# Patient Record
Sex: MALE | Race: WHITE | Employment: UNEMPLOYED | URBAN - METROPOLITAN AREA
[De-identification: names, ages, dates, MRNs, and addresses within clinical notes are randomized per-mention and may not be internally consistent; named-entity substitution may affect disease eponyms.]

---

## 2022-02-05 ENCOUNTER — APPOINTMENT (OUTPATIENT)
Dept: GENERAL RADIOLOGY | Age: 82
DRG: 087 | End: 2022-02-05
Payer: MEDICARE

## 2022-02-05 ENCOUNTER — HOSPITAL ENCOUNTER (INPATIENT)
Age: 82
LOS: 2 days | Discharge: HOME | DRG: 087 | End: 2022-02-07
Attending: EMERGENCY MEDICINE | Admitting: SURGERY
Payer: MEDICARE

## 2022-02-05 DIAGNOSIS — I61.9 INTRAPARENCHYMAL HEMORRHAGE OF BRAIN (HCC): ICD-10-CM

## 2022-02-05 DIAGNOSIS — I62.9 INTRACRANIAL HEMORRHAGE (HCC): Primary | ICD-10-CM

## 2022-02-05 LAB
ALLEN TEST: ABNORMAL
ANION GAP SERPL CALCULATED.3IONS-SCNC: 12 MMOL/L (ref 9–17)
BLOOD BANK SPECIMEN: ABNORMAL
BUN BLDV-MCNC: 36 MG/DL (ref 8–23)
CARBOXYHEMOGLOBIN: 0.9 % (ref 0–5)
CHLORIDE BLD-SCNC: 103 MMOL/L (ref 98–107)
CO2: 24 MMOL/L (ref 20–31)
CREAT SERPL-MCNC: 1.47 MG/DL (ref 0.7–1.2)
ETHANOL PERCENT: <0.01 %
ETHANOL: <10 MG/DL
FIO2: ABNORMAL
GFR AFRICAN AMERICAN: 56 ML/MIN
GFR NON-AFRICAN AMERICAN: 46 ML/MIN
GFR SERPL CREATININE-BSD FRML MDRD: ABNORMAL ML/MIN/{1.73_M2}
GFR SERPL CREATININE-BSD FRML MDRD: ABNORMAL ML/MIN/{1.73_M2}
GLUCOSE BLD-MCNC: 113 MG/DL (ref 75–110)
GLUCOSE BLD-MCNC: 128 MG/DL (ref 70–99)
HCG QUALITATIVE: ABNORMAL
HCO3 VENOUS: 25.9 MMOL/L (ref 24–30)
HCT VFR BLD CALC: 39 % (ref 40.7–50.3)
HEMOGLOBIN: 12.5 G/DL (ref 13–17)
INR BLD: 1
MCH RBC QN AUTO: 31 PG (ref 25.2–33.5)
MCHC RBC AUTO-ENTMCNC: 32.1 G/DL (ref 28.4–34.8)
MCV RBC AUTO: 96.8 FL (ref 82.6–102.9)
METHEMOGLOBIN: ABNORMAL % (ref 0–1.5)
MODE: ABNORMAL
MYOGLOBIN: 205 NG/ML (ref 28–72)
NEGATIVE BASE EXCESS, VEN: ABNORMAL MMOL/L (ref 0–2)
NOTIFICATION TIME: ABNORMAL
NOTIFICATION: ABNORMAL
NRBC AUTOMATED: 0 PER 100 WBC
O2 DEVICE/FLOW/%: ABNORMAL
O2 SAT, VEN: 51.3 % (ref 60–85)
OXYHEMOGLOBIN: ABNORMAL % (ref 95–98)
PARTIAL THROMBOPLASTIN TIME: 23.7 SEC (ref 20.5–30.5)
PATIENT TEMP: 37
PCO2, VEN, TEMP ADJ: ABNORMAL MMHG (ref 39–55)
PCO2, VEN: 46 (ref 39–55)
PDW BLD-RTO: 13 % (ref 11.8–14.4)
PEEP/CPAP: ABNORMAL
PH VENOUS: 7.37 (ref 7.32–7.42)
PH, VEN, TEMP ADJ: ABNORMAL (ref 7.32–7.42)
PLATELET # BLD: 275 K/UL (ref 138–453)
PMV BLD AUTO: 8.4 FL (ref 8.1–13.5)
PO2, VEN, TEMP ADJ: ABNORMAL MMHG (ref 30–50)
PO2, VEN: 26.8 (ref 30–50)
POSITIVE BASE EXCESS, VEN: 0.8 MMOL/L (ref 0–2)
POTASSIUM SERPL-SCNC: 5 MMOL/L (ref 3.7–5.3)
PROTHROMBIN TIME: 10.7 SEC (ref 9.1–12.3)
PSV: ABNORMAL
PT. POSITION: ABNORMAL
RBC # BLD: 4.03 M/UL (ref 4.21–5.77)
RESPIRATORY RATE: ABNORMAL
SAMPLE SITE: ABNORMAL
SARS-COV-2, RAPID: NOT DETECTED
SET RATE: ABNORMAL
SODIUM BLD-SCNC: 139 MMOL/L (ref 135–144)
SPECIMEN DESCRIPTION: NORMAL
TEXT FOR RESPIRATORY: ABNORMAL
TOTAL HB: ABNORMAL G/DL (ref 12–16)
TOTAL RATE: ABNORMAL
TROPONIN INTERP: ABNORMAL
TROPONIN INTERP: ABNORMAL
TROPONIN T: ABNORMAL NG/ML
TROPONIN T: ABNORMAL NG/ML
TROPONIN, HIGH SENSITIVITY: 54 NG/L (ref 0–22)
TROPONIN, HIGH SENSITIVITY: 57 NG/L (ref 0–22)
TSH SERPL DL<=0.05 MIU/L-ACNC: 3.28 MIU/L (ref 0.3–5)
VT: ABNORMAL
WBC # BLD: 17.6 K/UL (ref 3.5–11.3)

## 2022-02-05 PROCEDURE — 82565 ASSAY OF CREATININE: CPT

## 2022-02-05 PROCEDURE — 86920 COMPATIBILITY TEST SPIN: CPT

## 2022-02-05 PROCEDURE — 84443 ASSAY THYROID STIM HORMONE: CPT

## 2022-02-05 PROCEDURE — 6370000000 HC RX 637 (ALT 250 FOR IP)

## 2022-02-05 PROCEDURE — 86870 RBC ANTIBODY IDENTIFICATION: CPT

## 2022-02-05 PROCEDURE — 6810039001 HC L1 TRAUMA PRIORITY

## 2022-02-05 PROCEDURE — 85730 THROMBOPLASTIN TIME PARTIAL: CPT

## 2022-02-05 PROCEDURE — 2580000003 HC RX 258

## 2022-02-05 PROCEDURE — 84703 CHORIONIC GONADOTROPIN ASSAY: CPT

## 2022-02-05 PROCEDURE — 84520 ASSAY OF UREA NITROGEN: CPT

## 2022-02-05 PROCEDURE — 82947 ASSAY GLUCOSE BLOOD QUANT: CPT

## 2022-02-05 PROCEDURE — G0480 DRUG TEST DEF 1-7 CLASSES: HCPCS

## 2022-02-05 PROCEDURE — 99285 EMERGENCY DEPT VISIT HI MDM: CPT

## 2022-02-05 PROCEDURE — 71045 X-RAY EXAM CHEST 1 VIEW: CPT

## 2022-02-05 PROCEDURE — 85027 COMPLETE CBC AUTOMATED: CPT

## 2022-02-05 PROCEDURE — 82805 BLOOD GASES W/O2 SATURATION: CPT

## 2022-02-05 PROCEDURE — 2000000000 HC ICU R&B

## 2022-02-05 PROCEDURE — 85610 PROTHROMBIN TIME: CPT

## 2022-02-05 PROCEDURE — 86880 COOMBS TEST DIRECT: CPT

## 2022-02-05 PROCEDURE — 93005 ELECTROCARDIOGRAM TRACING: CPT

## 2022-02-05 PROCEDURE — 86900 BLOOD TYPING SEROLOGIC ABO: CPT

## 2022-02-05 PROCEDURE — 86901 BLOOD TYPING SEROLOGIC RH(D): CPT

## 2022-02-05 PROCEDURE — 87641 MR-STAPH DNA AMP PROBE: CPT

## 2022-02-05 PROCEDURE — 84484 ASSAY OF TROPONIN QUANT: CPT

## 2022-02-05 PROCEDURE — 83874 ASSAY OF MYOGLOBIN: CPT

## 2022-02-05 PROCEDURE — 86850 RBC ANTIBODY SCREEN: CPT

## 2022-02-05 PROCEDURE — 80051 ELECTROLYTE PANEL: CPT

## 2022-02-05 PROCEDURE — 87635 SARS-COV-2 COVID-19 AMP PRB: CPT

## 2022-02-05 RX ORDER — METHOCARBAMOL 750 MG/1
750 TABLET, FILM COATED ORAL EVERY 6 HOURS SCHEDULED
Status: DISCONTINUED | OUTPATIENT
Start: 2022-02-06 | End: 2022-02-07 | Stop reason: HOSPADM

## 2022-02-05 RX ORDER — SODIUM CHLORIDE 0.9 % (FLUSH) 0.9 %
5-40 SYRINGE (ML) INJECTION EVERY 12 HOURS SCHEDULED
Status: DISCONTINUED | OUTPATIENT
Start: 2022-02-05 | End: 2022-02-07 | Stop reason: HOSPADM

## 2022-02-05 RX ORDER — FENOFIBRATE 160 MG/1
160 TABLET ORAL DAILY
Status: DISCONTINUED | OUTPATIENT
Start: 2022-02-05 | End: 2022-02-07 | Stop reason: HOSPADM

## 2022-02-05 RX ORDER — OXYCODONE HYDROCHLORIDE AND ACETAMINOPHEN 5; 325 MG/1; MG/1
1 TABLET ORAL EVERY 4 HOURS PRN
COMMUNITY

## 2022-02-05 RX ORDER — POLYETHYLENE GLYCOL 3350 17 G/17G
17 POWDER, FOR SOLUTION ORAL DAILY
Status: DISCONTINUED | OUTPATIENT
Start: 2022-02-05 | End: 2022-02-07 | Stop reason: HOSPADM

## 2022-02-05 RX ORDER — OXYCODONE HYDROCHLORIDE 5 MG/1
5 TABLET ORAL EVERY 6 HOURS PRN
Status: DISCONTINUED | OUTPATIENT
Start: 2022-02-05 | End: 2022-02-06

## 2022-02-05 RX ORDER — ATORVASTATIN CALCIUM 20 MG/1
20 TABLET, FILM COATED ORAL DAILY
Status: DISCONTINUED | OUTPATIENT
Start: 2022-02-05 | End: 2022-02-07 | Stop reason: HOSPADM

## 2022-02-05 RX ORDER — GLUCOSAM/CHON-MSM1/C/MANG/BOSW 750-644 MG
1 TABLET ORAL 2 TIMES DAILY
COMMUNITY

## 2022-02-05 RX ORDER — FENOFIBRATE 145 MG/1
145 TABLET, COATED ORAL DAILY
COMMUNITY

## 2022-02-05 RX ORDER — DEXTROSE MONOHYDRATE 50 MG/ML
100 INJECTION, SOLUTION INTRAVENOUS PRN
Status: DISCONTINUED | OUTPATIENT
Start: 2022-02-05 | End: 2022-02-07 | Stop reason: HOSPADM

## 2022-02-05 RX ORDER — ACETAMINOPHEN 500 MG
500 TABLET ORAL EVERY 6 HOURS PRN
COMMUNITY

## 2022-02-05 RX ORDER — NICOTINE POLACRILEX 4 MG
15 LOZENGE BUCCAL PRN
Status: DISCONTINUED | OUTPATIENT
Start: 2022-02-05 | End: 2022-02-07 | Stop reason: HOSPADM

## 2022-02-05 RX ORDER — ACETAMINOPHEN 500 MG
1000 TABLET ORAL EVERY 8 HOURS SCHEDULED
Status: DISCONTINUED | OUTPATIENT
Start: 2022-02-05 | End: 2022-02-07 | Stop reason: HOSPADM

## 2022-02-05 RX ORDER — ONDANSETRON 4 MG/1
4 TABLET, ORALLY DISINTEGRATING ORAL EVERY 8 HOURS PRN
Status: DISCONTINUED | OUTPATIENT
Start: 2022-02-05 | End: 2022-02-07 | Stop reason: HOSPADM

## 2022-02-05 RX ORDER — IBUPROFEN 600 MG/1
600 TABLET ORAL EVERY 6 HOURS PRN
COMMUNITY

## 2022-02-05 RX ORDER — ATORVASTATIN CALCIUM 20 MG/1
20 TABLET, FILM COATED ORAL DAILY
COMMUNITY

## 2022-02-05 RX ORDER — GINSENG 100 MG
CAPSULE ORAL 3 TIMES DAILY
Status: DISCONTINUED | OUTPATIENT
Start: 2022-02-05 | End: 2022-02-07 | Stop reason: HOSPADM

## 2022-02-05 RX ORDER — LABETALOL HYDROCHLORIDE 5 MG/ML
INJECTION, SOLUTION INTRAVENOUS
Status: DISPENSED
Start: 2022-02-05 | End: 2022-02-06

## 2022-02-05 RX ORDER — SODIUM CHLORIDE 9 MG/ML
25 INJECTION, SOLUTION INTRAVENOUS PRN
Status: DISCONTINUED | OUTPATIENT
Start: 2022-02-05 | End: 2022-02-07 | Stop reason: HOSPADM

## 2022-02-05 RX ORDER — ONDANSETRON 2 MG/ML
4 INJECTION INTRAMUSCULAR; INTRAVENOUS EVERY 6 HOURS PRN
Status: DISCONTINUED | OUTPATIENT
Start: 2022-02-05 | End: 2022-02-07 | Stop reason: HOSPADM

## 2022-02-05 RX ORDER — SODIUM CHLORIDE 0.9 % (FLUSH) 0.9 %
5-40 SYRINGE (ML) INJECTION PRN
Status: DISCONTINUED | OUTPATIENT
Start: 2022-02-05 | End: 2022-02-07 | Stop reason: HOSPADM

## 2022-02-05 RX ORDER — DEXTROSE MONOHYDRATE 25 G/50ML
12.5 INJECTION, SOLUTION INTRAVENOUS PRN
Status: DISCONTINUED | OUTPATIENT
Start: 2022-02-05 | End: 2022-02-07 | Stop reason: HOSPADM

## 2022-02-05 RX ORDER — SENNA PLUS 8.6 MG/1
1 TABLET ORAL DAILY PRN
Status: DISCONTINUED | OUTPATIENT
Start: 2022-02-05 | End: 2022-02-07 | Stop reason: HOSPADM

## 2022-02-05 RX ORDER — SODIUM CHLORIDE 9 MG/ML
INJECTION, SOLUTION INTRAVENOUS CONTINUOUS
Status: DISCONTINUED | OUTPATIENT
Start: 2022-02-05 | End: 2022-02-06

## 2022-02-05 RX ADMIN — ATORVASTATIN CALCIUM 20 MG: 20 TABLET, FILM COATED ORAL at 21:32

## 2022-02-05 RX ADMIN — BACITRACIN: 500 OINTMENT TOPICAL at 21:36

## 2022-02-05 RX ADMIN — FENOFIBRATE 160 MG: 160 TABLET ORAL at 21:32

## 2022-02-05 RX ADMIN — SODIUM CHLORIDE: 9 INJECTION, SOLUTION INTRAVENOUS at 21:32

## 2022-02-05 RX ADMIN — ACETAMINOPHEN 1000 MG: 500 TABLET ORAL at 21:35

## 2022-02-05 ASSESSMENT — PAIN DESCRIPTION - LOCATION
LOCATION: SHOULDER

## 2022-02-05 ASSESSMENT — PAIN DESCRIPTION - PROGRESSION
CLINICAL_PROGRESSION: NOT CHANGED
CLINICAL_PROGRESSION: NOT CHANGED

## 2022-02-05 ASSESSMENT — PAIN DESCRIPTION - ONSET
ONSET: ON-GOING
ONSET: ON-GOING

## 2022-02-05 ASSESSMENT — PAIN SCALES - GENERAL
PAINLEVEL_OUTOF10: 2
PAINLEVEL_OUTOF10: 2
PAINLEVEL_OUTOF10: 1

## 2022-02-05 ASSESSMENT — PAIN DESCRIPTION - PAIN TYPE
TYPE: ACUTE PAIN

## 2022-02-05 ASSESSMENT — PAIN DESCRIPTION - DESCRIPTORS
DESCRIPTORS: DULL
DESCRIPTORS: ACHING
DESCRIPTORS: ACHING

## 2022-02-05 ASSESSMENT — PAIN DESCRIPTION - FREQUENCY
FREQUENCY: INTERMITTENT

## 2022-02-05 ASSESSMENT — PAIN DESCRIPTION - ORIENTATION
ORIENTATION: LEFT

## 2022-02-06 ENCOUNTER — ANCILLARY PROCEDURE (OUTPATIENT)
Dept: EMERGENCY DEPT | Age: 82
DRG: 087 | End: 2022-02-06
Payer: MEDICARE

## 2022-02-06 ENCOUNTER — APPOINTMENT (OUTPATIENT)
Dept: CT IMAGING | Age: 82
DRG: 087 | End: 2022-02-06
Payer: MEDICARE

## 2022-02-06 LAB
ABSOLUTE EOS #: 0.39 K/UL (ref 0–0.44)
ABSOLUTE IMMATURE GRANULOCYTE: 0.07 K/UL (ref 0–0.3)
ABSOLUTE LYMPH #: 1.54 K/UL (ref 1.1–3.7)
ABSOLUTE MONO #: 0.92 K/UL (ref 0.1–1.2)
ANION GAP SERPL CALCULATED.3IONS-SCNC: 9 MMOL/L (ref 9–17)
BASOPHILS # BLD: 0 % (ref 0–2)
BASOPHILS ABSOLUTE: 0.04 K/UL (ref 0–0.2)
BUN BLDV-MCNC: 30 MG/DL (ref 8–23)
BUN/CREAT BLD: ABNORMAL (ref 9–20)
CALCIUM SERPL-MCNC: 8.8 MG/DL (ref 8.6–10.4)
CHLORIDE BLD-SCNC: 107 MMOL/L (ref 98–107)
CO2: 22 MMOL/L (ref 20–31)
CREAT SERPL-MCNC: 1.33 MG/DL (ref 0.7–1.2)
DIFFERENTIAL TYPE: ABNORMAL
EOSINOPHILS RELATIVE PERCENT: 4 % (ref 1–4)
GFR AFRICAN AMERICAN: >60 ML/MIN
GFR NON-AFRICAN AMERICAN: 51 ML/MIN
GFR SERPL CREATININE-BSD FRML MDRD: ABNORMAL ML/MIN/{1.73_M2}
GFR SERPL CREATININE-BSD FRML MDRD: ABNORMAL ML/MIN/{1.73_M2}
GLUCOSE BLD-MCNC: 103 MG/DL (ref 70–99)
GLUCOSE BLD-MCNC: 119 MG/DL (ref 75–110)
GLUCOSE BLD-MCNC: 134 MG/DL (ref 75–110)
GLUCOSE BLD-MCNC: 92 MG/DL (ref 75–110)
GLUCOSE BLD-MCNC: 98 MG/DL (ref 75–110)
HCT VFR BLD CALC: 34.5 % (ref 40.7–50.3)
HEMOGLOBIN: 11.2 G/DL (ref 13–17)
IMMATURE GRANULOCYTES: 1 %
LYMPHOCYTES # BLD: 15 % (ref 24–43)
MCH RBC QN AUTO: 31.3 PG (ref 25.2–33.5)
MCHC RBC AUTO-ENTMCNC: 32.5 G/DL (ref 28.4–34.8)
MCV RBC AUTO: 96.4 FL (ref 82.6–102.9)
MONOCYTES # BLD: 9 % (ref 3–12)
MRSA, DNA, NASAL: NEGATIVE
NRBC AUTOMATED: 0 PER 100 WBC
PDW BLD-RTO: 13 % (ref 11.8–14.4)
PLATELET # BLD: 269 K/UL (ref 138–453)
PLATELET ESTIMATE: ABNORMAL
PMV BLD AUTO: 9.1 FL (ref 8.1–13.5)
POTASSIUM SERPL-SCNC: 4.6 MMOL/L (ref 3.7–5.3)
RBC # BLD: 3.58 M/UL (ref 4.21–5.77)
RBC # BLD: ABNORMAL 10*6/UL
SEG NEUTROPHILS: 71 % (ref 36–65)
SEGMENTED NEUTROPHILS ABSOLUTE COUNT: 7.43 K/UL (ref 1.5–8.1)
SODIUM BLD-SCNC: 138 MMOL/L (ref 135–144)
SPECIMEN DESCRIPTION: NORMAL
WBC # BLD: 10.4 K/UL (ref 3.5–11.3)
WBC # BLD: ABNORMAL 10*3/UL

## 2022-02-06 PROCEDURE — 2500000003 HC RX 250 WO HCPCS: Performed by: STUDENT IN AN ORGANIZED HEALTH CARE EDUCATION/TRAINING PROGRAM

## 2022-02-06 PROCEDURE — 70450 CT HEAD/BRAIN W/O DYE: CPT

## 2022-02-06 PROCEDURE — 3209999900 POC US FAST ABDOMEN LIMITED

## 2022-02-06 PROCEDURE — 36415 COLL VENOUS BLD VENIPUNCTURE: CPT

## 2022-02-06 PROCEDURE — 6370000000 HC RX 637 (ALT 250 FOR IP)

## 2022-02-06 PROCEDURE — 2500000003 HC RX 250 WO HCPCS

## 2022-02-06 PROCEDURE — 1200000000 HC SEMI PRIVATE

## 2022-02-06 PROCEDURE — 85025 COMPLETE CBC W/AUTO DIFF WBC: CPT

## 2022-02-06 PROCEDURE — 99221 1ST HOSP IP/OBS SF/LOW 40: CPT | Performed by: NEUROLOGICAL SURGERY

## 2022-02-06 PROCEDURE — 6370000000 HC RX 637 (ALT 250 FOR IP): Performed by: STUDENT IN AN ORGANIZED HEALTH CARE EDUCATION/TRAINING PROGRAM

## 2022-02-06 PROCEDURE — 80048 BASIC METABOLIC PNL TOTAL CA: CPT

## 2022-02-06 PROCEDURE — 82947 ASSAY GLUCOSE BLOOD QUANT: CPT

## 2022-02-06 PROCEDURE — 2580000003 HC RX 258

## 2022-02-06 RX ORDER — LABETALOL HYDROCHLORIDE 5 MG/ML
INJECTION, SOLUTION INTRAVENOUS
Status: COMPLETED
Start: 2022-02-06 | End: 2022-02-06

## 2022-02-06 RX ORDER — LABETALOL HYDROCHLORIDE 5 MG/ML
10 INJECTION, SOLUTION INTRAVENOUS ONCE
Status: COMPLETED | OUTPATIENT
Start: 2022-02-07 | End: 2022-02-06

## 2022-02-06 RX ORDER — FAMOTIDINE 20 MG/1
20 TABLET, FILM COATED ORAL 2 TIMES DAILY
Status: DISCONTINUED | OUTPATIENT
Start: 2022-02-06 | End: 2022-02-07 | Stop reason: HOSPADM

## 2022-02-06 RX ORDER — LABETALOL HYDROCHLORIDE 5 MG/ML
10 INJECTION, SOLUTION INTRAVENOUS ONCE
Status: COMPLETED | OUTPATIENT
Start: 2022-02-06 | End: 2022-02-06

## 2022-02-06 RX ORDER — OXYCODONE HYDROCHLORIDE 5 MG/1
2.5 TABLET ORAL EVERY 6 HOURS PRN
Status: DISCONTINUED | OUTPATIENT
Start: 2022-02-06 | End: 2022-02-06

## 2022-02-06 RX ADMIN — SODIUM CHLORIDE, PRESERVATIVE FREE 10 ML: 5 INJECTION INTRAVENOUS at 20:12

## 2022-02-06 RX ADMIN — SODIUM CHLORIDE: 9 INJECTION, SOLUTION INTRAVENOUS at 04:51

## 2022-02-06 RX ADMIN — SODIUM CHLORIDE, PRESERVATIVE FREE 10 ML: 5 INJECTION INTRAVENOUS at 20:09

## 2022-02-06 RX ADMIN — ACETAMINOPHEN 1000 MG: 500 TABLET ORAL at 22:08

## 2022-02-06 RX ADMIN — BACITRACIN: 500 OINTMENT TOPICAL at 13:11

## 2022-02-06 RX ADMIN — FAMOTIDINE 20 MG: 20 TABLET, FILM COATED ORAL at 13:11

## 2022-02-06 RX ADMIN — Medication 10 MG: at 20:10

## 2022-02-06 RX ADMIN — ACETAMINOPHEN 1000 MG: 500 TABLET ORAL at 13:11

## 2022-02-06 RX ADMIN — BACITRACIN: 500 OINTMENT TOPICAL at 08:10

## 2022-02-06 RX ADMIN — METHOCARBAMOL TABLETS 750 MG: 750 TABLET, COATED ORAL at 23:40

## 2022-02-06 RX ADMIN — LABETALOL HYDROCHLORIDE 10 MG: 5 INJECTION, SOLUTION INTRAVENOUS at 23:47

## 2022-02-06 RX ADMIN — FAMOTIDINE 20 MG: 20 TABLET, FILM COATED ORAL at 20:10

## 2022-02-06 RX ADMIN — FENOFIBRATE 160 MG: 160 TABLET ORAL at 08:10

## 2022-02-06 RX ADMIN — BACITRACIN: 500 OINTMENT TOPICAL at 20:11

## 2022-02-06 RX ADMIN — METHOCARBAMOL TABLETS 750 MG: 750 TABLET, COATED ORAL at 12:24

## 2022-02-06 RX ADMIN — ATORVASTATIN CALCIUM 20 MG: 20 TABLET, FILM COATED ORAL at 08:10

## 2022-02-06 RX ADMIN — ACETAMINOPHEN 1000 MG: 500 TABLET ORAL at 05:00

## 2022-02-06 RX ADMIN — METHOCARBAMOL TABLETS 750 MG: 750 TABLET, COATED ORAL at 05:00

## 2022-02-06 RX ADMIN — METHOCARBAMOL TABLETS 750 MG: 750 TABLET, COATED ORAL at 18:10

## 2022-02-06 RX ADMIN — METHOCARBAMOL TABLETS 750 MG: 750 TABLET, COATED ORAL at 00:12

## 2022-02-06 RX ADMIN — Medication 10 MG: at 23:47

## 2022-02-06 RX ADMIN — SODIUM CHLORIDE, PRESERVATIVE FREE 10 ML: 5 INJECTION INTRAVENOUS at 08:14

## 2022-02-06 ASSESSMENT — ENCOUNTER SYMPTOMS
VOMITING: 0
SHORTNESS OF BREATH: 0
DIARRHEA: 0
NAUSEA: 0
CONSTIPATION: 0
SORE THROAT: 0
PHOTOPHOBIA: 0
COUGH: 0
ABDOMINAL PAIN: 0

## 2022-02-06 ASSESSMENT — PAIN DESCRIPTION - PROGRESSION
CLINICAL_PROGRESSION: NOT CHANGED

## 2022-02-06 ASSESSMENT — PAIN DESCRIPTION - ONSET
ONSET: ON-GOING

## 2022-02-06 ASSESSMENT — PAIN DESCRIPTION - LOCATION
LOCATION: SHOULDER

## 2022-02-06 ASSESSMENT — PAIN SCALES - GENERAL
PAINLEVEL_OUTOF10: 0
PAINLEVEL_OUTOF10: 4
PAINLEVEL_OUTOF10: 0
PAINLEVEL_OUTOF10: 0

## 2022-02-06 ASSESSMENT — PAIN DESCRIPTION - ORIENTATION
ORIENTATION: LEFT
ORIENTATION: LEFT
ORIENTATION: LEFT;RIGHT

## 2022-02-06 ASSESSMENT — PAIN DESCRIPTION - FREQUENCY
FREQUENCY: INTERMITTENT

## 2022-02-06 ASSESSMENT — PAIN DESCRIPTION - PAIN TYPE
TYPE: ACUTE PAIN

## 2022-02-06 ASSESSMENT — PAIN DESCRIPTION - DESCRIPTORS
DESCRIPTORS: ACHING

## 2022-02-06 NOTE — PROGRESS NOTES
oriented, in no acute distress  HEENT: Atraumatic; PERRLA, EOMI  LUNGS: Normal effort, CTAB  CV: RRR no m/r/g  GI: Soft, nondistended, nontender  MUSCULOSKELETAL: Atraumatic, moves all extremities equally  NEUROLOGIC: no focal neurological deficits      LAB:  CBC:   Recent Labs     02/05/22 1904 02/06/22  0508   WBC 17.6* 10.4   HGB 12.5* 11.2*   HCT 39.0* 34.5*   MCV 96.8 96.4    269     BMP:   Recent Labs     02/05/22  1904 02/06/22  0508    138   K 5.0 4.6    107   CO2 24 22   BUN 36* 30*   CREATININE 1.47* 1.33*   GLUCOSE 128* 103*         RADIOLOGY:  CT HEAD WO CONTRAST   Preliminary Result   1. Small intraparenchymal hemorrhage in the posterior right temporal lobe,   with areas of surrounding edema, measuring approximately 1.5 x 1.1 cm.   2. Small amounts of intraventricular hemorrhage layering in the occipital   horns of the lateral ventricles. 3. Cerebral parenchymal volume loss with chronic microvascular white matter   ischemic disease. 4. Posterior scalp soft tissue swelling. No evidence of acute fracture. 5. Scattered moderate paranasal sinus disease with thickened secretions in   the frontal sinuses and ethmoid air cells, correlate with signs of underlying   infection. RECOMMENDATIONS:   Unavailable         XR CHEST PORTABLE   Final Result   Moderate to severe ill-defined multifocal pneumonia, possibly COVID   pneumonia. Correlate clinically.                Barbra Duffy MD  2/6/22, 7:56 AM

## 2022-02-06 NOTE — ED NOTES
Verbal report given to Marija Ascencio Rn with understanding of the patients needs and concerns, all question answered   Wife and Son remain at bedside with spiritual care   Pt resting in bed, NAD noted rr even and non labored. Bed locked in lowest position, environment free of clutter. Call light within reach, will continue to monitor.         Jill Cain RN  02/05/22 2056

## 2022-02-06 NOTE — ED NOTES
The following labs labeled with pt sticker and tubed to lab:     [] Blue     [] Georgetta Expose   [] on ice  [x] Green/yellow  [] Green/black [] on ice  [] Yellow  [] Red  [] Pink      [] COVID-19 swab    [] Rapid  [] PCR  [] Flu swab  [] Peds Viral Panel     [] Urine Sample  [] Pelvic Cultures  [] Blood Cultures            Savanah Houston RN  02/05/22 2044

## 2022-02-06 NOTE — PROGRESS NOTES
[address] Project Playlist  Speech Language Pathology    Date: 2/6/2022  Patient Name: Helen Borjas  YOB: 1940   AGE: 79 y.o.   MRN: [id number]        Patient Not Available for Speech Therapy     Due to:  [x] Testing- CT  [] Hemodialysis  [] Cancelled by RN  [] Surgery   [] Intubation/Sedation/Pain Medication  [] Medical instability  [] Other:    Next scheduled treatment: 2/7  Completed by: Daniel Molina, SLP

## 2022-02-06 NOTE — ED PROVIDER NOTES
[facility]  Emergency Department Encounter  EmergencyMedicine Resident     Pt Name:Dontrell Wheat  MRN: [id number]  Georgegfanthony 1940  Date of evaluation: 2/5/22  PCP:  No primary care provider on file. CHIEF COMPLAINT       Chief Complaint   Patient presents with    Fall       HISTORY OF PRESENT ILLNESS  (Location/Symptom, Timing/Onset, Context/Setting, Quality, Duration, Modifying Factors, Severity.)      Ayaz Ramos is a 79 y.o. male who was transferred from [facility] for intraparenchymal hemorrhage. Earlier today patient notes that he believes he slipped on some ice and went back to his head. Given his age went to the ER for evaluation. At [facility] CT of his head revealed intraparenchymal hemorrhage and was transferred to [facility] for both neurosurgical and trauma surgery evaluation. Upon arrival he notes that his only complaint is that he is having left greater than right shoulder pain that is new from the fall. He is not taking anything for the pain. Of note patient denies any LOC, but is amnesic after the fall. Lawson Foster He denies having any other complaints including headache, change in vision, neck pain, back pain, chest pain, shortness of breath, abdominal pain, nauseous vomiting, numbness tingling or weakness. PAST MEDICAL / SURGICAL / SOCIAL / FAMILY HISTORY      has a past medical history of Arthritis, Diabetes mellitus ([address] and Hyperlipidemia. Pt denies any pertinent past surgical history.     Social History     Socioeconomic History    Marital status:      Spouse name: Not on file    Number of children: Not on file    Years of education: Not on file    Highest education level: Not on file   Occupational History    Not on file   Tobacco Use    Smoking status: Never Smoker    Smokeless tobacco: Never Used   Substance and Sexual Activity    Alcohol use: Not Currently    Drug use: Never    Sexual activity: Not Currently   Other Topics Concern    Not on file Social History Narrative    Not on file     Social Determinants of Health     Financial Resource Strain:     Difficulty of Paying Living Expenses: Not on file   Food Insecurity:     Worried About Running Out of Food in the Last Year: Not on file    Michael of Food in the Last Year: Not on file   Transportation Needs:     Lack of Transportation (Medical): Not on file    Lack of Transportation (Non-Medical): Not on file   Physical Activity:     Days of Exercise per Week: Not on file    Minutes of Exercise per Session: Not on file   Stress:     Feeling of Stress : Not on file   Social Connections:     Frequency of Communication with Friends and Family: Not on file    Frequency of Social Gatherings with Friends and Family: Not on file    Attends Denominational Services: Not on file    Active Member of [address] [place] [address] [email or web address] or Organizations: Not on file    Attends Club or Organization Meetings: Not on file    Marital Status: Not on file   Intimate Partner Violence:     Fear of Current or Ex-Partner: Not on file    Emotionally Abused: Not on file    Physically Abused: Not on file    Sexually Abused: Not on file   Housing Stability:     Unable to Pay for Housing in the Last Year: Not on file    Number of [place] in the Last Year: Not on file    Unstable Housing in the Last Year: Not on file       History reviewed. No pertinent family history. Allergies:  Patient has no known allergies. Home Medications:  Prior to Admission medications    Medication Sig Start Date End Date Taking?  Authorizing Provider   acetaminophen (TYLENOL) 500 MG tablet Take 500 mg by mouth every 6 hours as needed for Pain   Yes Historical Provider, MD   atorvastatin (LIPITOR) 20 MG tablet Take 20 mg by mouth daily   Yes Historical Provider, MD   fenofibrate (TRICOR) 145 MG tablet Take 145 mg by mouth daily   Yes Historical Provider, MD   ibuprofen (ADVIL;MOTRIN) 600 MG tablet Take 600 mg by mouth every 6 hours as needed for Pain   Yes Historical Provider, MD   metFORMIN (GLUCOPHAGE) 500 MG tablet Take 500 mg by mouth Daily with supper    Yes Historical Provider, MD   Glucosamine-Chondroitin (OSTEO BI-FLEX REGULAR STRENGTH) 250-200 MG TABS Take 1 tablet by mouth 2 times daily    Yes Historical Provider, MD   vitamin D (CHOLECALCIFEROL) 25 MCG (1000 UT) TABS tablet Take 1,000 Units by mouth daily   Yes Historical Provider, MD   oxyCODONE-acetaminophen (PERCOCET) 5-325 MG per tablet Take 1 tablet by mouth every 4 hours as needed for Pain. Historical Provider, MD       REVIEW OF SYSTEMS    (2-9 systems for level 4, 10 or more for level 5)      Review of Systems   Constitutional: Negative for chills, diaphoresis, fatigue and fever. HENT: Negative for congestion and sore throat. Eyes: Negative for photophobia and visual disturbance. Respiratory: Negative for cough and shortness of breath. Cardiovascular: Negative for chest pain, palpitations and leg swelling. Gastrointestinal: Negative for abdominal pain, constipation, diarrhea, nausea and vomiting. Genitourinary: Negative for dysuria and hematuria. Musculoskeletal: Positive for arthralgias (b/l shoulders). Negative for myalgias. Skin: Negative for rash and wound. Neurological: Negative for weakness, light-headedness, numbness and headaches. PHYSICAL EXAM   (up to 7 for level 4, 8 or more for level 5)      INITIAL VITALS:   BP (!) 171/115   Pulse 78   Temp 98 °F (36.7 °C)   Resp 30   Ht 5' 10\" (1.778 m)   Wt 195 lb (88.5 kg)   SpO2 98%   BMI 27.98 kg/m²     Physical Exam  Vitals and nursing note reviewed. Constitutional:       General: He is not in acute distress. Appearance: Normal appearance. He is normal weight. He is not toxic-appearing or diaphoretic. HENT:      Head: Normocephalic.       Comments: Hemostatic abrasion to left occiput     Right Ear: Tympanic membrane, ear canal and external ear normal.      Left Ear: Tympanic membrane, ear canal and external ear normal.      Ears:      Comments: B/l TM negative for hemotympanum     Nose: Nose normal.      Comments: B/l negative nasal hematomas     Mouth/Throat:      Mouth: Mucous membranes are moist.      Pharynx: Oropharynx is clear. Eyes:      General: No scleral icterus. Extraocular Movements: Extraocular movements intact. Conjunctiva/sclera: Conjunctivae normal.      Pupils: Pupils are equal, round, and reactive to light. Cardiovascular:      Rate and Rhythm: Normal rate. Pulses: Normal pulses. Radial pulses are 2+ on the right side and 2+ on the left side. Femoral pulses are 2+ on the right side and 2+ on the left side. Dorsalis pedis pulses are 2+ on the right side and 2+ on the left side. Heart sounds: No murmur heard. No friction rub. No gallop. Pulmonary:      Effort: Pulmonary effort is normal. No respiratory distress. Breath sounds: Normal breath sounds. Abdominal:      General: Abdomen is flat. There is no distension. Tenderness: There is no abdominal tenderness. There is no guarding or rebound. Musculoskeletal:         General: No swelling or tenderness. Normal range of motion. Cervical back: Normal range of motion and neck supple. No rigidity. Comments: No tenderness, step-offs, deformities normal spinal column. Full A/P removal extremities without crepitus. Skin:     General: Skin is warm and dry. Capillary Refill: Capillary refill takes less than 2 seconds. Neurological:      Mental Status: He is alert and oriented to person, place, and time. Mental status is at baseline. GCS: GCS eye subscore is 4. GCS verbal subscore is 5. GCS motor subscore is 6.          DIFFERENTIAL  DIAGNOSIS     PLAN (LABS / IMAGING / EKG):  Orders Placed This Encounter   Procedures    COVID-19, Rapid    XR CHEST PORTABLE    Trauma Panel    TROP/MYOGLOBIN    Type and Screen       MEDICATIONS ORDERED:  Orders Placed This Encounter   Medications    labetalol (NORMODYNE;TRANDATE) 5 MG/ML injection     ENRICO WADE: cabinet override       DDX: [facility]    DIAGNOSTIC RESULTS / EMERGENCY DEPARTMENT COURSE / MDM   LAB RESULTS:  Results for orders placed or performed during the hospital encounter of 02/05/22   COVID-19, Rapid    Specimen: Nasopharyngeal Swab   Result Value Ref Range    Specimen Description . NASOPHARYNGEAL SWAB     SARS-CoV-2, Rapid Not Detected Not Detected   Trauma Panel   Result Value Ref Range    Ethanol <10 <10 mg/dL    Ethanol percent <0.010 <0.010 %    Blood Bank Specimen BILL FOR SERVICES PERFORMED     BUN 36 (H) 8 - 23 mg/dL    WBC 17.6 (H) 3.5 - 11.3 k/uL    RBC 4.03 (L) 4.21 - 5.77 m/uL    Hemoglobin 12.5 (L) 13.0 - 17.0 g/dL    Hematocrit 39.0 (L) 40.7 - 50.3 %    MCV 96.8 82.6 - 102.9 fL    MCH 31.0 25.2 - 33.5 pg    MCHC 32.1 28.4 - 34.8 g/dL    RDW 13.0 11.8 - 14.4 %    Platelets 816 080 - 761 k/uL    MPV 8.4 8.1 - 13.5 fL    NRBC Automated 0.0 0.0 per 100 WBC    CREATININE 1.47 (H) 0.70 - 1.20 mg/dL    GFR Non- 46 (L) >60 mL/min    GFR  56 (L) >60 mL/min    GFR Comment          GFR Staging NOT REPORTED     Glucose 128 (H) 70 - 99 mg/dL    hCG Qual  PT MALE NEGATIVE    Sodium 139 135 - 144 mmol/L    Potassium 5.0 3.7 - 5.3 mmol/L    Chloride 103 98 - 107 mmol/L    CO2 24 20 - 31 mmol/L    Anion Gap 12 9 - 17 mmol/L    Protime 10.7 9.1 - 12.3 sec    INR 1.0     PTT 23.7 20.5 - 30.5 sec    pH, Joshua 7.369 7.320 - 7.420    pCO2, Joshua 46.0 39 - 55    pO2, Joshua 26.8 (L) 30 - 50    HCO3, Venous 25.9 24 - 30 mmol/L    Positive Base Excess, Joshua 0.8 0.0 - 2.0 mmol/L    Negative Base Excess, Joshua NOT REPORTED 0.0 - 2.0 mmol/L    O2 Sat, Joshua 51.3 (L) 60.0 - 85.0 %    Total Hb NOT REPORTED 12.0 - 16.0 g/dl    Oxyhemoglobin NOT REPORTED 95.0 - 98.0 %    Carboxyhemoglobin 0.9 0 - 5 %    Methemoglobin NOT REPORTED 0.0 - 1.5 %    Pt Temp 37.0     pH, Joshua, Temp Adj NOT REPORTED 7.320 - 7.420    pCO2, Joshua, Temp Adj NOT REPORTED 39 - 55 mmHg    pO2, Joshua, Temp Adj NOT REPORTED 30 - 50 mmHg    O2 Device/Flow/% NOT REPORTED     Respiratory Rate NOT REPORTED     Lonnie Test NOT REPORTED     Sample Site NOT REPORTED     Pt. Position NOT REPORTED     Mode NOT REPORTED     Set Rate NOT REPORTED     Total Rate NOT REPORTED     VT NOT REPORTED     FIO2 UNKNOWN     Peep/Cpap NOT REPORTED     PSV NOT REPORTED     Text for Respiratory NOT REPORTED     NOTIFICATION NOT REPORTED     NOTIFICATION TIME NOT REPORTED    TROP/MYOGLOBIN   Result Value Ref Range    Troponin, High Sensitivity 54 (HH) 0 - 22 ng/L    Troponin T NOT REPORTED <0.03 ng/mL    Troponin Interp NOT REPORTED     Myoglobin 205 (H) 28 - 72 ng/mL   TSH with Reflex   Result Value Ref Range    TSH 3.28 0.30 - 5.00 mIU/L   Troponin   Result Value Ref Range    Troponin, High Sensitivity 57 (HH) 0 - 22 ng/L    Troponin T NOT REPORTED <0.03 ng/mL    Troponin Interp NOT REPORTED    POC Glucose Fingerstick   Result Value Ref Range    POC Glucose 113 (H) 75 - 110 mg/dL   TYPE AND SCREEN   Result Value Ref Range    Expiration Date 02/08/2022,2359     Arm Band Number [id number]     ABO/Rh A NEGATIVE     Antibody Screen POSITIVE     Antibody ID Anti-D Present     SAÚL IgG NEGATIVE     Unit Number [id number]     Product Code Leukocyte Reduced Red Cell     Unit Divison 00     Dispense Status ALLOCATED     Transfusion Status OK TO TRANSFUSE     Crossmatch Result COMPATIBLE     Unit Number [id number]     Product Code Leukocyte Reduced Red Cell     Unit Divison 00     Dispense Status ALLOCATED     Transfusion Status OK TO TRANSFUSE     Crossmatch Result COMPATIBLE        IMPRESSION: This 79 male being transferred from [facility] for [address] after fall. Patient appears to be no acute distress and nontoxic-appearing. Patient initial vitals reveals a hypertension in the 180s otherwise unremarkable. Patient has a GCS of 15 moving all extremities and following commands.  Patient has intact airway with no concerns for compromise at this time along with speaking in full sentence without respiratory distress. Hemodynamically stable. Slight migration of the left occiput, there is no signs of trauma besides patient's complaint of bilateral shoulder pain, but has full range of movement without crepitus or deformities noted. Trauma team at bedside. Imaging and CT per them. Trauma panel ordered. Will give 10 of labetalol for blood pressure control. Plan for admission to trauma ICU given [facility]. RADIOLOGY:  XR CHEST PORTABLE    Result Date: 2/5/2022  EXAMINATION: ONE XRAY VIEW OF THE CHEST 2/5/2022 7:09 pm COMPARISON: None. HISTORY: ORDERING SYSTEM PROVIDED HISTORY: fall TECHNOLOGIST PROVIDED HISTORY: fall Reason for Exam: Supine port FINDINGS: There is moderate to severe ill-defined multifocal airspace disease involving all lobes of both lungs. Disease is accentuated by the low volume portable technique. There is no pneumothorax or significant pleural effusion. Heart size is upper limits of normal.  No acute bone finding. Moderate to severe ill-defined multifocal pneumonia, possibly COVID pneumonia. Correlate clinically. EKG  EKG Interpretation    Interpreted by emergency department physician    Rhythm: normal sinus   Rate: normal  Axis: normal  Ectopy: premature atrial contraction  Conduction: normal  ST Segments: normal  T Waves: normal  Q Waves: none    Clinical Impression: no acute changes    Elva Hunt DO      All EKG's are interpreted by the Emergency Department Physician who either signs or Co-signs this chart in the absence of a cardiologist.    EMERGENCY DEPARTMENT COURSE:  ED Course as of 02/06/22 0433   Sat Feb 05, 2022   1906 Images did not come with the patient nor the reads. Calls placed with [facility] to [facility] for images and reads [WK]   2001 WBC(!): 17.6  Likely structure reaction.  [CS]   2002 BUN/creatinine of 36/1.47, suggestive of prerenal PATRICE [CS]   2002 Troponin, High Sensitivity(!!): 54  As patient denies any chest pain, or shortness of breath at this time, EKG has been ordered and reviewed with no concerns for STEMI, will continue to monitor evaluate. Patient is not a candidate for aspirin given IPH. [CS]   2011 SARS-CoV-2, Rapid: Not Detected [CS]   2014 EKG Interpretation    Interpreted by emergency department physician    Rhythm: normal sinus  and with sinus arrhthmia  Rate: normal  Axis: normal  Ectopy: none  Conduction: normal  ST Segments: normal  T Waves: normal  Q Waves: none    EKG  Impression: non-specific EKG     [WK]      ED Course User Index  [CS] Caroline Prescott DO  [WK] Vandana Metz DO         PROCEDURES:  None    CONSULTS:  IP CONSULT TO NEUROSURGERY    CRITICAL CARE:  Please see attending note    FINAL IMPRESSION      1. Intracranial hemorrhage ([facility] [address]          DISPOSITION / PLAN     DISPOSITION Admitted 02/05/2022 07:58:06 PM      PATIENT REFERRED TO:  No follow-up provider specified.     DISCHARGE MEDICATIONS:  Current Discharge Medication List          Caroline Prescott DO  Emergency Medicine Resident    (Please note that portions of thisnote were completed with a voice recognition program.  Efforts were made to edit the dictations but occasionally words are mis-transcribed.)       Caroline Prescott DO  Resident  02/06/22 2004

## 2022-02-06 NOTE — ED NOTES
Neurosurgery resident Jonathan at bedside talking with wife, giving brief update  Pt resting in bed, NAD noted rr even and non labored. Bed locked in lowest position, environment free of clutter. Call light within reach, will continue to monitor.         Zulma Deng RN  02/05/22 2032

## 2022-02-06 NOTE — ED NOTES
Writer is talking with patient, while in trauma bay  Pt states he was outside when he fell, patient states wife thought he had LOC for about 2 to 3 seconds, before EMS arrived on scene      [place], RN  02/05/22 1920

## 2022-02-06 NOTE — ED PROVIDER NOTES
[facility]     Emergency Department     Faculty Note/ Attestation      Pt Name: Helen Borjas                                       MRN: [id number]  [place] 1940  Date of evaluation: 2/5/2022    Patients PCP:    No primary care provider on file. Attestation  I performed a history and physical examination of the patient and discussed management with the resident. I reviewed the residents note and agree with the documented findings and plan of care. Any areas of disagreement are noted on the chart. I was personally present for the key portions of any procedures. I have documented in the chart those procedures where I was not present during the key portions. I have reviewed the emergency nurses triage note. I agree with the chief complaint, past medical history, past surgical history, allergies, medications, social and family history as documented unless otherwise noted below. For Physician Assistant/ Nurse Practitioner cases/documentation I have personally evaluated this patient and have completed at least one if not all key elements of the E/M (history, physical exam, and MDM). Additional findings are as noted. Initial Screens:             Vitals:    Vitals:    02/05/22 1903   BP: (!) 198/80   Pulse: 80   Resp: (!) 39   Temp: 98 °F (36.7 °C)   SpO2: 96%       CHIEF COMPLAINT     No chief complaint on file. The pt is a 80 YO M with fall on ice striking his head per report found to have intracranial bleed. NO imaging sent with patient but per report pt is an intraparenchymal bleed. The pt has no vomiting now no nausea, the pt moving all extremities. The pt was given 5mg of labetolol for pressure over 200. EMERGENCY DEPARTMENT COURSE:     -------------------------  BP: (!) 198/80, Temp: 98 °F (36.7 °C), Pulse: 80, Resp: (!) 39  Physical Exam  Constitutional:       Appearance: He is not ill-appearing.    HENT:      Head:      Comments: Contusion to back of scalp     Right Ear: External ear normal.      Left Ear: External ear normal.      Nose: No congestion or rhinorrhea. Mouth/Throat:      Mouth: Mucous membranes are moist.      Pharynx: Oropharynx is clear. Eyes:      Extraocular Movements: Extraocular movements intact. Pupils: Pupils are equal, round, and reactive to light. Neck:      Comments: Per report C spine cleared   Cardiovascular:      Rate and Rhythm: Normal rate. Pulses: Normal pulses. Pulmonary:      Effort: Pulmonary effort is normal. No respiratory distress. Neurological:      Mental Status: He is alert. Comments  The patient arrives complaining of trauma, there are no signs of: Airway compromise, Tension pneumothorax, Flail chest, Massive hemothorax, pericardial tamponade, Traumatic shock, or signs of ongoing hemorrhage. This patient would be appropriate for diagnostic testing at this time. ED Course as of 02/05/22 2015   Sat Feb 05, 2022   1906 Images did not come with the patient nor the reads. Calls placed with [facility] to [facility] for images and reads [WK]   2001 WBC(!): 17.6  Likely structure reaction. [CS]   2002 BUN/creatinine of 36/1.47, suggestive of prerenal PATRICE [CS]   2002 Troponin, High Sensitivity(!!): 54  As patient denies any chest pain, or shortness of breath at this time, EKG has been ordered and reviewed with no concerns for STEMI, will continue to monitor evaluate. Patient is not a candidate for aspirin given IP.  [CS]   2011 SARS-CoV-2, Rapid: Not Detected [CS]   2014 EKG Interpretation    Interpreted by emergency department physician    Rhythm: normal sinus  and with sinus arrhthmia  Rate: normal  Axis: normal  Ectopy: none  Conduction: normal  ST Segments: normal  T Waves: normal  Q Waves: none    EKG  Impression: non-specific EKG     [WK]      ED Course User Index  [CS] Malachi Pino DO  [WK] Lauryn Molina DO   Pt admitted to trauma at this time    CRITICAL CARE: There was a high probability of clinically significant/life threatening deterioration in this patient's condition which required my urgent intervention. Total critical care time was 13 minutes. This excludes any time for separately reportable procedures. Leydi Ramon DO,, DO, RDMS.   Attending Emergency Physician         Leydi Ramon DO  02/05/22 2016

## 2022-02-06 NOTE — PROGRESS NOTES
Physical Therapy        Physical Therapy Cancel Note      DATE: 2022    NAME: Euell Babinski  MRN: [id number]   : 1940      Patient not seen this date for Physical Therapy due to: Other: continous bed rest. Ck .       Electronically signed by Shmuel Woodall PT on 2022 at 2:24 PM

## 2022-02-06 NOTE — FLOWSHEET NOTE
[facility]     Emergency/Trauma Note    PATIENT NAME: Therese Carrington    Shift date: 2/5/2022  Shift day: Saturday   Shift # 2    Room # 8532/8679-78   Name: Therese Carrington           Age: 79 y.o. Gender: male          Caodaism: No Adventist on file  Place of Latter-day:      Trauma/Incident type: Adult Trauma Priority  Admit Date & Time: 2/5/2022  6:48 PM  TRAUMA NAME: Therese Carrington    ADVANCE DIRECTIVES IN CHART? No    NAME OF DECISION MAKER: Tye Krueger [phone]    RELATIONSHIP OF DECISION MAKER TO PATIENT: Spouse    PATIENT/EVENT DESCRIPTION:  Therese Carrington is a 79 y.o. male who arrived via transport from scene as a trauma Priority from a fall. Pt to be admitted to [facility]/0178-01. SPIRITUAL ASSESSMENT/INTERVENTION:     met patient upon entry to trauma bay.  facilitated notification of family and inquired of patient's emotional state. Patient is calm and does not appear to be in any emotional or spiritual distress. 02/05/22 2116   Encounter Summary   Services provided to: Patient   Referral/Consult From: Multi-disciplinary team   Support System Family members   Continue Visiting   (2/5/22)   Complexity of Encounter Moderate   Length of Encounter 1 hour   Spiritual Assessment Completed Yes   Crisis   Type Trauma   Assessment Calm; Approachable   Intervention Active listening;Explored feelings, thoughts, concerns   Outcome Expressed gratitude          PATIENT BELONGINGS:   did not manage patient's belongings    ANY BELONGINGS OF SIGNIFICANT VALUE NOTED:  No    REGISTRATION STAFF NOTIFIED?   Yes      WHAT IS YOUR SPIRITUAL CARE PLAN FOR THIS PATIENT?:   Chaplains will remain available to offer spiritual and emotional support upon request.      Electronically signed by Chaplain Resident Flaco Cooley MDiv.on 2/5/2022 at [address] [place] [address]  [phone]

## 2022-02-06 NOTE — CONSULTS
[address]  [address].    Department of Neurosurgery  Resident Consult Note      Reason for Consult:  [facility]  Requesting Physician:  Dr. Arsenio Reaves:   [x]Dr. Светлана Willis  []Dr. Dejuan Owen  []Dr. Jacqueline Gabriel  []Dr. Vanda Saul  []Dr. Shalom Arana  []Dr. Taiwo Gorman    History Obtained From:  patient, electronic medical record    CHIEF COMPLAINT:         Fall, IPH    HISTORY OF PRESENT ILLNESS:       The patient is a 79 y.o. male who presents with history of fall. Patient was walking out of his house to go somewhere when he possibly slipped on ice and fell. Patient is amnestic to events surrounding the fall, states he recalls standing there and the next thing he remembers is lying on the floor with people around him. Patient does endorse hitting his head, states the posterior left side of his head hurts, visible indentation present. Patient unsure of any LOC, wife states the patient lost consciousness for about 3 minutes. The fall was unwitnessed, wife had just stepped inside the house to grab something. EMS was called and patient was taken to [facility] where he underwent CT head, reviewed right-sided IPH measuring 1 cm in size. Patient was transferred to [facility] for trauma and neurosurgical evaluation and management. Upon arrival patient was neurologically completely intact, only complaining of left shoulder pain. Patient denied any nausea, vomiting, headache at rest, did endorse pain at the site of head trauma. PAST MEDICAL HISTORY :       Past Medical History:    No past medical history on file. Past Surgical History:    No past surgical history on file.     Social History:   Social History     Socioeconomic History    Marital status: Not on file     Spouse name: Not on file    Number of children: Not on file    Years of education: Not on file    Highest education level: Not on file   Occupational History    Not on file   Tobacco Use    Smoking status: Not on file  Smokeless tobacco: Not on file   Substance and Sexual Activity    Alcohol use: Not on file    Drug use: Not on file    Sexual activity: Not on file   Other Topics Concern    Not on file   Social History Narrative    Not on file     Social Determinants of Health     Financial Resource Strain:     Difficulty of Paying Living Expenses: Not on file   Food Insecurity:     Worried About Running Out of Food in the Last Year: Not on file    Michael of Food in the Last Year: Not on file   Transportation Needs:     Lack of Transportation (Medical): Not on file    Lack of Transportation (Non-Medical): Not on file   Physical Activity:     Days of Exercise per Week: Not on file    Minutes of Exercise per Session: Not on file   Stress:     Feeling of Stress : Not on file   Social Connections:     Frequency of Communication with Friends and Family: Not on file    Frequency of Social Gatherings with Friends and Family: Not on file    Attends Muslim Services: Not on file    Active Member of [address] [place] [address] or Organizations: Not on file    Attends Club or Organization Meetings: Not on file    Marital Status: Not on file   Intimate Partner Violence:     Fear of Current or Ex-Partner: Not on file    Emotionally Abused: Not on file    Physically Abused: Not on file    Sexually Abused: Not on file   Housing Stability:     Unable to Pay for Housing in the Last Year: Not on file    Number of [place] in the Last Year: Not on file    Unstable Housing in the Last Year: Not on file       Family History:   No family history on file. Allergies:   Patient has no known allergies.     Home Medications:  Prior to Admission medications    Not on File       Current Medications:   Current Facility-Administered Medications: labetalol (NORMODYNE;TRANDATE) 5 MG/ML injection, , ,     REVIEW OF SYSTEMS:       General ROS - No fevers, no chills  Ophthalmic ROS - No changes in vision from baseline  ENT ROS -  No sore throat, no rhinorrhea  Respiratory ROS - no cough, no shortness of breath  Cardiovascular ROS - No chest pain  Gastrointestinal ROS - No abdominal pain, no nausea, no vomiting  Genito-Urinary ROS - No dysuria  Musculoskeletal ROS - No neck pain, no back pain  Neurological ROS - No focal weakness or loss of sensation, no headache  Dermatological ROS - No lesions, no rash  Vascular/Lymphatic ROS - No edema    PHYSICAL EXAM:       Vitals:    02/05/22 1931   BP: (!) 169/72   Pulse: 81   Resp: 25   Temp:    SpO2: 98%       CONSTITUTIONAL:  Well developed, well nourished, alert and oriented x 4, in no acute distress, nontoxic. No dysarthria, no aphasia. EOMI.     HEAD:  normocephalic, atraumatic    EYES:  PERRLA, EOMI   ENT:  moist mucous membranes, no stridor, no tracheal deviation   NECK:  no midline tenderness to palpation, no step-offs or deformities   BACK:  no midline tenderness to palpation, no step-offs or deformities    LUNGS:  CTAB, equal air entry bilaterally, no wheezes or rales   CARDIOVASCULAR:  RRR, no murmur   ABDOMEN:  Soft, no rigidity   NEUROLOGIC:  EYE OPENING     Spontaneous - 4 [x]       To voice - 3 []       To pain - 2 []       None - 1 []    VERBAL RESPONSE     Appropriate, oriented - 5 [x]       Dazed or confused - 4 []       Syllables, expletives - 3 []       Grunts - 2 []       None - 1 []    MOTOR RESPONSE     Spontaneous, command - 6 [x]       Localizes pain - 5 []       Withdraws pain - 4 []       Abnormal flexion - 3 []       Abnormal extension - 2 []       None - 1 []            Total GCS: 15    Mental Status:  A & O x3, awake             Cranial Nerves:    cranial nerves II-XII are grossly intact    Motor Exam:    Drift:  absent  Tone:  normal    Motor exam is symmetrical 5 out of 5 all extremities bilaterally    Sensory:    Touch:    Right Upper Extremity:  normal  Left Upper Extremity:  normal  Right Lower Extremity:  normal  Left Lower Extremity:  normal    Deep Tendon Reflexes:    Right Bicep: 2+  Left Bicep:  2+  Right Knee:  2+  Left Knee:  2+    Plantar Response:    Right:  downgoing  Left:  downgoing    Clonus:  absent  Petty's:  absent    Coordination/Dysmetria:  Heel to Shin:  Right:  normal  Left:  normal  Finger to Nose:   Right:  normal  Left:  normal   Dysdiadochokinesia:  absent    Gait:  Not tested   SKIN:  no rash      LABS AND IMAGING:     Labs:  CBC with Differential:    Lab Results   Component Value Date    WBC 17.6 02/05/2022    RBC 4.03 02/05/2022    HGB 12.5 02/05/2022    HCT 39.0 02/05/2022     02/05/2022    MCV 96.8 02/05/2022    MCH 31.0 02/05/2022    MCHC 32.1 02/05/2022    RDW 13.0 02/05/2022     BMP:    Lab Results   Component Value Date    NA PENDING 02/05/2022    K PENDING 02/05/2022    CL PENDING 02/05/2022    CO2 PENDING 02/05/2022    BUN PENDING 02/05/2022    CREATININE PENDING 02/05/2022    GFRAA PENDING 02/05/2022    LABGLOM PENDING 02/05/2022    GLUCOSE PENDING 02/05/2022       Radiology Review:  CT head wo contrast    ASSESSMENT AND PLAN:     There is no problem list on file for this patient. A/P:  Rodney Eastman is a 79 y.o. male who presents with history of fall with head trauma, possible LOC for 3 minutes. Patient was taken to outlying facility, CT head showed right-sided IPH, measuring 1 cm in size per report. Patient transferred for neurosurgical management, completely neurologically intact at this time. Admitted to trauma service. Patient care will be discussed with attending, will reevaluate patient along with attending.     - No neurosurgical interventions planned for now  - CTLS recommendations: None  - HOB: 30 degrees   - Neuro checks per protocol  - Hold all antiplatelets and anticoagulants  - We recommend SBP < 140   - Determine the lower limit of SBP clinically based on mentation      Additional recommendations may follow    Please contact neurosurgery with any changes in patient's neurologic status. Thank you for your consult. Joaquim Lopes MD    PGY-2 Neurology Resident Physician  Neurosurgery Team - pager [phone]  2/5/2022 7:39 PM

## 2022-02-06 NOTE — H&P
Trauma, Emergency and Critical Surgical Services        TRAUMA HISTORY AND PHYSICAL EXAMINATION  (V 2.0)    PATIENT NAME: Ayaz Ramos  YOB: 1940  MEDICAL RECORD NO. [id number]   DATE: 2/5/2022  PRIMARY CARE PHYSICIAN: No primary care provider on file. PATIENT EVALUATED AT THE REQUEST OF DR. Nicki Brizuela [facility] 15.     [x] Trauma Priority     []Trauma Consult. IMPRESSION:     Patient Active Problem List   Diagnosis    Intraparenchymal hemorrhage of brain [facility])       MEDICAL DECISION MAKING AND PLAN:     Admit to TICU under trauma service. Neurosurgery consult  NPO  IVF   Pain control   Strict I/Os  Remain CTLS precaution. F/u CT head, c-spine, chest/abd/pelvis, and dorsal/lumbar. F/u trauma panel, and urine drug screen. CONSULT SERVICES    [x] Neurosurgery     [] Orthopedic Surgery    [] Cardiothoracic     [] Facial Trauma    [] Plastic Surgery (Burn)    [] Pediatric Surgery     [] Internal Medicine    [] Pulmonary Medicine    [] Other:       HISTORY:     SOURCE OF INFORMATION  Patient information was obtained from patient. History/Exam limitations: none. INJURY SUMMARY  Intraparenchymal Hemorrhage    GENERAL DATA  Age 79 y.o.  male   Patient information was obtained from patient and EMS personnel. History/Exam limitations: none. Patient presented to the Emergency Department by ambulance where the patient received see Ambulance Run Sheet prior to arrival.  Injury Date: 2/5/2022     Transport mode:   [x]Ambulance      [] Helicopter     []Car       [] Other  Referring Hospital: [facility], (e.g., home, farm, industry, street)  Specific Details of Location (e.g., bedroom, kitchen, garage): home    MECHANISM OF INJURY      [x]Fall    [x]From Standing     []From Height   Ft     []Down Stairs    HISTORY: Patient is an 80yo male who presented as a transfer with a fall from standing onto Ice. He fell backwards and hit his head on ice. He had an abrasion to his head. No other complaints at this time. Pt did have LOC for about 3 min per wife. Loss of Consciousness []No   [x]Yes Duration(min) 3min   Total Fluids Given Prior To Arrival  cc    MEDICATIONS:   []  None     []  Information not available due to exam limitations documented above  Prior to Admission medications    Medication Sig Start Date End Date Taking? Authorizing Provider   acetaminophen (TYLENOL) 500 MG tablet Take 500 mg by mouth every 6 hours as needed for Pain   Yes Historical Provider, MD   atorvastatin (LIPITOR) 20 MG tablet Take 20 mg by mouth daily   Yes Historical Provider, MD   fenofibrate (TRICOR) 145 MG tablet Take 145 mg by mouth daily   Yes Historical Provider, MD   ibuprofen (ADVIL;MOTRIN) 600 MG tablet Take 600 mg by mouth every 6 hours as needed for Pain   Yes Historical Provider, MD   metFORMIN (GLUCOPHAGE) 500 MG tablet Take 500 mg by mouth Daily with supper    Yes Historical Provider, MD   Glucosamine-Chondroitin (OSTEO BI-FLEX REGULAR STRENGTH) 250-200 MG TABS Take 1 tablet by mouth 2 times daily    Yes Historical Provider, MD   vitamin D (CHOLECALCIFEROL) 25 MCG (1000 UT) TABS tablet Take 1,000 Units by mouth daily   Yes Historical Provider, MD   oxyCODONE-acetaminophen (PERCOCET) 5-325 MG per tablet Take 1 tablet by mouth every 4 hours as needed for Pain. Historical Provider, MD   Aspirin, statin, fenofibrate, metformin, percocet, oteobiflex, vit D    ALLERGIES:   [x]  None    []   Information not available due to exam limitations documented above   Patient has no known allergies.     PAST MEDICAL HISTORY: []  None   []   Information not available due to exam limitations documented above    Colon resection  Prostate cancer  DM  HLD    FAMILY HISTORY   []   Information not available due to exam limitations documented above    Unknown by patient    SOCIAL HISTORY  []   Information not available due to exam limitations documented above    No drug use or alcohol    PERTINENT SYSTEMIC REVIEW: []   Information not available due to exam limitations documented above    General: Denies fever, chills, night sweats, weight loss, malaise, fatigue  HEENT: Denies sore throat, sinus problems, allergic rhinosinusitis  Card: Denies chest pain, palpitations, orthopnea/PND. Denies h/o murmurs  Pulm: Denies cough, shortness of breath, REID  GI:  per HPI; denies history of constipation, diarrhea, hematochezia or melena  : Denies polyuria, dysuria, hematuria  Endo: Denies diabetes, thyroid problems. Heme: Denies anemia, h/o bleeding or clotting problems. Neuro: Denies h/o CVA, TIA  Skin: Denies rashes, ulcers  Musculoskeletal: Denies muscle, joint, back pain.       PHYSICAL EXAMINATION:   GLASCOW COMA SCALE  NEUROMUSCULAR BLOCKADE PRIOR TO ARRIVAL     [x]No        []Yes      Variable  Score   Variable  Score  Eye opening [x]Spontaneous 4 Verbal  [x]Oriented  5     []To voice  3   []Confused  4    []To pain  2   []Inapp words  3    []None  1   []Incomp words 2       []None  1   Motor   [x]Obeys  6    []Localizes pain 5    []Withdraws(pain) 4    []Flexion(pain) 3  []Extension(pain) 2    []None  1     GCS Total = 15    PHYSICAL EXAMINATION  VITAL SIGNS:   Vitals:    02/05/22 2200   BP: (!) 151/66   Pulse: 70   Resp: 24   Temp:    SpO2: 96%       General Appearance: alert and oriented to person, place and time, well developed and well- nourished, in no acute distress  Skin: warm and dry, no rash or erythema; abrasion posterior scalp  Head: normocephalic and atraumatic  Eyes: pupils equal, round, and reactive to light, extraocular eye movements intact, conjunctivae normal  ENT: tympanic membrane, external ear and ear canal normal bilaterally, nose without deformity, nasal mucosa and turbinates normal without polyps  Neck: supple and non-tender without mass, no thyromegaly or thyroid nodules, no cervical lymphadenopathy  Pulmonary/Chest: Equal breath sounds b/l  Cardiovascular: S1S2  Abdomen: soft, non-tender, non-distended  Pelvis:  Stable  Back:  No abrasions/lesions. No obvious deformities. No TTP. No step-offs  Extremities: palpable radial, femoral, and pedal pulses b/l  Musculoskeletal: normal range of motion, no joint swelling, deformity or tenderness  Neurologic: speech normal    FOCUSED ABDOMINAL SONOGRAM FOR TRAUMA (FAST): A good  quality examination was performed by Dr. Robbie Ang and representative images were obtained. [x] No free fluid in the abdomen       RADIOLOGY  XR CHEST PORTABLE    Result Date: 2/5/2022  EXAMINATION: ONE XRAY VIEW OF THE CHEST 2/5/2022 7:09 pm COMPARISON: None. HISTORY: ORDERING SYSTEM PROVIDED HISTORY: fall TECHNOLOGIST PROVIDED HISTORY: fall Reason for Exam: Supine port FINDINGS: There is moderate to severe ill-defined multifocal airspace disease involving all lobes of both lungs. Disease is accentuated by the low volume portable technique. There is no pneumothorax or significant pleural effusion. Heart size is upper limits of normal.  No acute bone finding. Moderate to severe ill-defined multifocal pneumonia, possibly COVID pneumonia. Correlate clinically.        LABS  Labs Reviewed   TRAUMA PANEL - Abnormal; Notable for the following components:       Result Value    BUN 36 (*)     WBC 17.6 (*)     RBC 4.03 (*)     Hemoglobin 12.5 (*)     Hematocrit 39.0 (*)     CREATININE 1.47 (*)     GFR Non- 46 (*)     GFR African American 56 (*)     Glucose 128 (*)     pO2, Joshua 26.8 (*)     O2 Sat, Joshua 51.3 (*)     All other components within normal limits   TROP/MYOGLOBIN - Abnormal; Notable for the following components:    Troponin, High Sensitivity 54 (*)     Myoglobin 205 (*)     All other components within normal limits   TROPONIN - Abnormal; Notable for the following components:    Troponin, High Sensitivity 57 (*)     All other components within normal limits   POC GLUCOSE FINGERSTICK - Abnormal; Notable for the following components:    POC Glucose 113 (*) All other components within normal limits   COVID-19, RAPID   MRSA DNA PROBE, NASAL   TSH WITH REFLEX   URINE DRUG SCREEN   URINALYSIS   BASIC METABOLIC PANEL W/ REFLEX TO MG FOR LOW K   CBC WITH AUTO DIFFERENTIAL   PREVIOUS SPECIMEN   TYPE AND SCREEN       PROCEDURES (SEE SEPARATE OPERATIVE REPORTS)  []CENTRAL LINE  []OROTRACHEAL INTUBATION  []CHEST TUBE  []ARTERIAL LINE  []OTHER    Alysa Quinteros, DO  2/5/22, 10:21 PM       Attending Note      I have reviewed the above GCS note(s) and I either performed the key elements of the medical history and physical exam or was present with the trauma resident when the key elements of the medical history and physical exam were performed. I have discussed the findings, established the care plan and recommendations with the trauma team.  Transfer from OSH with rt sided 1cm IPH. No anticoagulants. Abd U/S negative. Denies any pain, will admit to ICU, follow exam/labs, Consult NS.     Andres Gaitan MD  2/6/2022  12:08 AM

## 2022-02-06 NOTE — PROGRESS NOTES
Trauma/Surigical Critical Care Sign Out Note:     Date and time: 2022 3:16 PM  Patient's name:  Cynthia Campo  Medical Record Number: [id number]  Patient's YOB: 1940  Age: 79 y.o. Date of Admission: 2022  6:48 PM  Length of stay during current admission: 1    Code Status: Full Code    Mode of physician to physician communication:        [x] Via telephone   [] In person     Date and time of sign-out: 2022 3:16 PM    Accepting surgery resident: Dr. Megha Wu    Accepting surgery attending: Dr. Mirna Ward    Patient's current ICU Bed:  178    Patient's assigned bed on floor:  161        [x] Med-Surg Monitored [] Step-down         Reason for ICU admission:  IPH    Injuries:  Right-sided IPH    ICU course summary:  Transfer from [facility] after [facility] w/ LOC. Found to have 8mm x 1cm right-sided IPH. Repeat CT Head showing slightly enlarged IPH but patient with no neurological deficits. Evaluated by neurosurgery, no acute intervention recommended at this time.     Current vitals:  Temp: Temp: 98.6 °F (37 °C)Temp  Av.4 °F (36.9 °C)  Min: 98 °F (36.7 °C)  Max: 98.6 °F (37 °C) BP Systolic (82BWP), JONNY:487 , Min:135 , XSW:978   Diastolic (25RKG), GZF:23, Min:61, Max:126   Pulse Pulse  Av.5  Min: 60  Max: 84 Resp Resp  Av.9  Min: 7  Max: 39 Pulse ox SpO2  Av.7 %  Min: 89 %  Max: 99 %    Physical exam:  GENERAL: alert, no distress  NEURO: CNII-XII grossly intact; moving all extremities  LUNGS: normal effort; symmetric rise and fall of chest wall  HEART: regular rate and rhythm  ABDOMEN: soft, nondistended, tender to palpation; no guarding, rebound or rigidity  EXTREMITY: no cyanosis, clubbing or edema    Cultures:  Blood cultures:                 [x] None drawn      [] Negative             []  Positive (Details:   Urine Culture:                   [x] None drawn      [] Negative             []  Positive (Details:   Sputum Culture:               [x] None drawn       [] Negative             []  Positive (Details:    Endotracheal aspirate:     [x] None drawn       [] Negative             []  Positive (Details:      Consults:  Neurosurgery    Assessment:  Small right-sided IPH after fall [address]    Plan and recommended follow-up:    Neuro:  IPH  CT head 2/5- 8mm x 1cm right-sided IPH, no shift  CT head 2/6- 1.5cm x 1.1cm IPH with small amounts of IVH, no shift  NS consulted; no acute surgical intervention recommended at this time  GCS 15  Pain regimen: Tylenol, Robaxin  CV  HR 61-67  MAP   Home meds: lipitor, fenofibrate  Pulm  On RA  GI/Nutrition  Renal diet  Pepcid- prophy  Renal/lytes  BUN/Cr 30 / 1.33 ( 36 / 1.47)  Na/K 138 / 4.6 ( 139 / 5.0)  Voiding spontaneously  Heme  DVT prophylaxis-SCDs  Hg 11.2  Start Lovenox 2/7 if normal exam  7. Endocrine        1.         2. On metformin at home        3.  HDSS   Musculoskeletal  PT/OT  Skin  No acute injuries  Micro  Afebrile  WBC 10.4  Family/dispo  To Med-Surg  Lines  PIV       Matt Barcenas MD, PGY-2  2/7/2022, 8:13 AM

## 2022-02-06 NOTE — PROGRESS NOTES
Occupational [address]  Occupational Therapy Not Seen Note    DATE: 2022    NAME: Renzo Covington  MRN: [id number]   : 1940      Patient not seen this date for Occupational Therapy due to: Other: Bedrest order, brain bleed may be increasing per RN. Hold OT eval.    Next Scheduled Treatment: Attempt on  as appropriate.     Electronically signed by Zahraa Meza OT on 2022 at 12:47 PM

## 2022-02-06 NOTE — FLOWSHEET NOTE
SPIRITUAL CARE DEPARTMENT - [address] 82  PROGRESS NOTE    Shift date: 2/5/22  Shift day: Saturday   Shift # 2    Room # 2171/4930-81   Name: Nupur Silva            Age: 79 y.o. Gender: male          Amish:    Place of Gnosticist:     Referral: Routine Visit    Admit Date & Time: 2/5/2022  6:48 PM    PATIENT/EVENT DESCRIPTION:  Nupur Silva is a 79 y.o. male   Anuradha Bailey outside of home in the driveway, LOC. Wife indicates that maybe there was some black ice. Pt. Is responsive and in good spirits. SPIRITUAL ASSESSMENT/INTERVENTION:   was rounding, seen family at bedside.  ask for permission to enter. Pt and family was present discussing pt being moved to TICU  Pt. Has a head injury. Son King Sharath was tearful and concerned. Wife Ask for prayer.  prayed.  sustain ministry of presence. SPIRITUAL CARE FOLLOW-UP PLAN:  Chaplains will remain available to offer spiritual and emotional support as needed. Electronically signed by Sergio Ashton, on 2/5/2022 at [facility]  [phone]       02/05/22 2118   Encounter Summary   Services provided to: Patient and family together   Referral/Consult From: [address] [place] [address]; Children   Continue Visiting   (2/5/22)   Complexity of Encounter Moderate   Length of Encounter 30 minutes   Spiritual Assessment Completed Yes   Routine   Type Initial   Assessment Calm; Approachable   Intervention Active listening;Explored feelings, thoughts, concerns;Prayer;Nurtured hope;Empowerment   Outcome Acceptance;Comfort;Receptive

## 2022-02-06 NOTE — CARE COORDINATION
Case Management Initial Discharge Plan  Rodney Eastman,             Met with:patient to discuss discharge plans. Information verified: address, contacts, phone number, , insurance Yes  Insurance Provider:  [facility] Medicare    Emergency Contact/Next of Kin name & number:   Wife and son  Who are involved in patient's support system? wife    PCP: No primary care provider on file. Date of last visit:      Discharge Planning    Living Arrangements:        Home has 2 stories  3 stairs to climb to get into front door, 15stairs to climb to reach second floor  Location of bedroom/bathroom in home second floor    Patient able to perform ADL's:Independent    Current Services (outpatient & in home)   DME equipment: cane, glucometer  DME provider:     Is patient receiving oral anticoagulation therapy? No    Potential Assistance Needed:       Patient agreeable to home care: Yes  Freedom of choice provided:  yes    Prior SNF/Rehab Placement and Facility:   Agreeable to SNF/Rehab: No  [place] of choice provided: n/a     Evaluation: n/a    Expected Discharge date:       Patient expects to be discharged to: If home: is the family and/or caregiver wiling & able to provide support at home? yes  Who will be providing this support? wife*    Follow Up Appointment: Best Day/ Time:      Transportation provider: has transporation  Transportation arrangements needed for discharge: Yes    Readmission Risk              Risk of Unplanned Readmission:  9             Does patient have a readmission risk score greater than 14?: No  If yes, follow-up appointment must be made within 7 days of discharge. Goals of Care:       Educated patient on transitional options, provided freedom of choice and are agreeable with plan      Discharge Plan: home; declines hc needs. Has transportation.        Electronically signed by Chester Soni RN on 22 at 5:42 PM EST

## 2022-02-07 VITALS
HEIGHT: 70 IN | TEMPERATURE: 98.3 F | RESPIRATION RATE: 22 BRPM | HEART RATE: 85 BPM | OXYGEN SATURATION: 98 % | WEIGHT: 202.6 LBS | DIASTOLIC BLOOD PRESSURE: 71 MMHG | BODY MASS INDEX: 29.01 KG/M2 | SYSTOLIC BLOOD PRESSURE: 152 MMHG

## 2022-02-07 LAB
ABSOLUTE EOS #: 0.7 K/UL (ref 0–0.44)
ABSOLUTE IMMATURE GRANULOCYTE: 0.09 K/UL (ref 0–0.3)
ABSOLUTE LYMPH #: 1.25 K/UL (ref 1.1–3.7)
ABSOLUTE MONO #: 1 K/UL (ref 0.1–1.2)
ANION GAP SERPL CALCULATED.3IONS-SCNC: 13 MMOL/L (ref 9–17)
BASOPHILS # BLD: 0 % (ref 0–2)
BASOPHILS ABSOLUTE: 0.05 K/UL (ref 0–0.2)
BUN BLDV-MCNC: 28 MG/DL (ref 8–23)
BUN/CREAT BLD: ABNORMAL (ref 9–20)
CALCIUM SERPL-MCNC: 9.1 MG/DL (ref 8.6–10.4)
CHLORIDE BLD-SCNC: 100 MMOL/L (ref 98–107)
CO2: 20 MMOL/L (ref 20–31)
CREAT SERPL-MCNC: 1.21 MG/DL (ref 0.7–1.2)
DIFFERENTIAL TYPE: ABNORMAL
EKG ATRIAL RATE: 76 BPM
EKG P AXIS: 32 DEGREES
EKG P-R INTERVAL: 180 MS
EKG Q-T INTERVAL: 364 MS
EKG QRS DURATION: 72 MS
EKG QTC CALCULATION (BAZETT): 409 MS
EKG R AXIS: -7 DEGREES
EKG T AXIS: 34 DEGREES
EKG VENTRICULAR RATE: 76 BPM
EOSINOPHILS RELATIVE PERCENT: 6 % (ref 1–4)
GFR AFRICAN AMERICAN: >60 ML/MIN
GFR NON-AFRICAN AMERICAN: 57 ML/MIN
GFR SERPL CREATININE-BSD FRML MDRD: ABNORMAL ML/MIN/{1.73_M2}
GFR SERPL CREATININE-BSD FRML MDRD: ABNORMAL ML/MIN/{1.73_M2}
GLUCOSE BLD-MCNC: 110 MG/DL (ref 75–110)
GLUCOSE BLD-MCNC: 111 MG/DL (ref 70–99)
GLUCOSE BLD-MCNC: 142 MG/DL (ref 75–110)
HCT VFR BLD CALC: 36 % (ref 40.7–50.3)
HEMOGLOBIN: 12 G/DL (ref 13–17)
IMMATURE GRANULOCYTES: 1 %
LYMPHOCYTES # BLD: 11 % (ref 24–43)
MCH RBC QN AUTO: 31.8 PG (ref 25.2–33.5)
MCHC RBC AUTO-ENTMCNC: 33.3 G/DL (ref 28.4–34.8)
MCV RBC AUTO: 95.5 FL (ref 82.6–102.9)
MONOCYTES # BLD: 9 % (ref 3–12)
NRBC AUTOMATED: 0 PER 100 WBC
PDW BLD-RTO: 13.1 % (ref 11.8–14.4)
PLATELET # BLD: 322 K/UL (ref 138–453)
PLATELET ESTIMATE: ABNORMAL
PMV BLD AUTO: 9.3 FL (ref 8.1–13.5)
POTASSIUM SERPL-SCNC: 4.2 MMOL/L (ref 3.7–5.3)
RBC # BLD: 3.77 M/UL (ref 4.21–5.77)
RBC # BLD: ABNORMAL 10*6/UL
SEG NEUTROPHILS: 73 % (ref 36–65)
SEGMENTED NEUTROPHILS ABSOLUTE COUNT: 8.71 K/UL (ref 1.5–8.1)
SODIUM BLD-SCNC: 133 MMOL/L (ref 135–144)
WBC # BLD: 11.8 K/UL (ref 3.5–11.3)
WBC # BLD: ABNORMAL 10*3/UL

## 2022-02-07 PROCEDURE — 97166 OT EVAL MOD COMPLEX 45 MIN: CPT

## 2022-02-07 PROCEDURE — 2580000003 HC RX 258

## 2022-02-07 PROCEDURE — 97535 SELF CARE MNGMENT TRAINING: CPT

## 2022-02-07 PROCEDURE — 97161 PT EVAL LOW COMPLEX 20 MIN: CPT

## 2022-02-07 PROCEDURE — 93010 ELECTROCARDIOGRAM REPORT: CPT | Performed by: INTERNAL MEDICINE

## 2022-02-07 PROCEDURE — 97530 THERAPEUTIC ACTIVITIES: CPT

## 2022-02-07 PROCEDURE — 6370000000 HC RX 637 (ALT 250 FOR IP)

## 2022-02-07 PROCEDURE — 6360000002 HC RX W HCPCS: Performed by: STUDENT IN AN ORGANIZED HEALTH CARE EDUCATION/TRAINING PROGRAM

## 2022-02-07 PROCEDURE — 36415 COLL VENOUS BLD VENIPUNCTURE: CPT

## 2022-02-07 PROCEDURE — 82947 ASSAY GLUCOSE BLOOD QUANT: CPT

## 2022-02-07 PROCEDURE — 6370000000 HC RX 637 (ALT 250 FOR IP): Performed by: STUDENT IN AN ORGANIZED HEALTH CARE EDUCATION/TRAINING PROGRAM

## 2022-02-07 PROCEDURE — 80048 BASIC METABOLIC PNL TOTAL CA: CPT

## 2022-02-07 PROCEDURE — 92523 SPEECH SOUND LANG COMPREHEN: CPT

## 2022-02-07 PROCEDURE — 85025 COMPLETE CBC W/AUTO DIFF WBC: CPT

## 2022-02-07 RX ORDER — HEPARIN SODIUM 5000 [USP'U]/ML
5000 INJECTION, SOLUTION INTRAVENOUS; SUBCUTANEOUS EVERY 8 HOURS SCHEDULED
Status: DISCONTINUED | OUTPATIENT
Start: 2022-02-07 | End: 2022-02-07 | Stop reason: HOSPADM

## 2022-02-07 RX ADMIN — METHOCARBAMOL TABLETS 750 MG: 750 TABLET, COATED ORAL at 05:55

## 2022-02-07 RX ADMIN — SODIUM CHLORIDE, PRESERVATIVE FREE 10 ML: 5 INJECTION INTRAVENOUS at 08:02

## 2022-02-07 RX ADMIN — BACITRACIN: 500 OINTMENT TOPICAL at 08:38

## 2022-02-07 RX ADMIN — INSULIN LISPRO 3 UNITS: 100 INJECTION, SOLUTION INTRAVENOUS; SUBCUTANEOUS at 11:37

## 2022-02-07 RX ADMIN — ACETAMINOPHEN 1000 MG: 500 TABLET ORAL at 14:07

## 2022-02-07 RX ADMIN — ATORVASTATIN CALCIUM 20 MG: 20 TABLET, FILM COATED ORAL at 08:38

## 2022-02-07 RX ADMIN — ACETAMINOPHEN 1000 MG: 500 TABLET ORAL at 05:56

## 2022-02-07 RX ADMIN — HEPARIN SODIUM 5000 UNITS: 5000 INJECTION INTRAVENOUS; SUBCUTANEOUS at 08:37

## 2022-02-07 RX ADMIN — METHOCARBAMOL TABLETS 750 MG: 750 TABLET, COATED ORAL at 11:37

## 2022-02-07 RX ADMIN — BACITRACIN: 500 OINTMENT TOPICAL at 14:07

## 2022-02-07 RX ADMIN — FAMOTIDINE 20 MG: 20 TABLET, FILM COATED ORAL at 08:38

## 2022-02-07 RX ADMIN — FENOFIBRATE 160 MG: 160 TABLET ORAL at 08:38

## 2022-02-07 RX ADMIN — HEPARIN SODIUM 5000 UNITS: 5000 INJECTION INTRAVENOUS; SUBCUTANEOUS at 14:06

## 2022-02-07 ASSESSMENT — PAIN SCALES - GENERAL
PAINLEVEL_OUTOF10: 0

## 2022-02-07 NOTE — PLAN OF CARE
Problem: Pain:  Goal: Pain level will decrease  Description: Pain level will decrease  2/7/2022 1453 by Laney Ojeda RN  Outcome: Completed  2/7/2022 0216 by Michael Gray RN  Outcome: Ongoing  Goal: Control of acute pain  Description: Control of acute pain  2/7/2022 1453 by Laney Ojeda RN  Outcome: Completed  2/7/2022 0216 by Michael Gray RN  Outcome: Ongoing  Goal: Control of chronic pain  Description: Control of chronic pain  2/7/2022 1453 by Laney Ojeda RN  Outcome: Completed  2/7/2022 0216 by Michael Gray RN  Outcome: Ongoing     Problem: Skin Integrity:  Goal: Will show no infection signs and symptoms  Description: Will show no infection signs and symptoms  2/7/2022 1453 by Laney Ojeda RN  Outcome: Completed  2/7/2022 0216 by Michael Gray RN  Outcome: Ongoing  Goal: Absence of new skin breakdown  Description: Absence of new skin breakdown  2/7/2022 1453 by Laney Ojeda RN  Outcome: Completed  2/7/2022 0216 by Michael Gray RN  Outcome: Ongoing     Problem: Falls - Risk of:  Goal: Will remain free from falls  Description: Will remain free from falls  2/7/2022 1453 by Laney Ojeda RN  Outcome: Completed  2/7/2022 0216 by Michael Gray RN  Outcome: Ongoing  Goal: Absence of physical injury  Description: Absence of physical injury  2/7/2022 1453 by Laney Ojeda RN  Outcome: Completed  2/7/2022 0216 by Michael Gray RN  Outcome: Ongoing

## 2022-02-07 NOTE — PROGRESS NOTES
Writer perfect serve message Dr. Matti Chase with Trauma notified /64 and recheck 166/59. No new orders at this time and continue to monitor.

## 2022-02-07 NOTE — PROGRESS NOTES
Speech Language Pathology  Facility/Department: [address] BURN UNIT  Initial Speech/Language/Cognitive Assessment    NAME: Rodney Eastman  : 1940   MRN: [id number]  ADMISSION DATE: 2022  ADMITTING DIAGNOSIS: has Intraparenchymal hemorrhage of brain (HCC) and Contusion of brain without loss of consciousness ([address] on their problem list.      Date of Eval: 2022   Evaluating Therapist: Clementine Schirmer    RECENT RESULTS  CT OF HEAD/MRI:  1. Small intraparenchymal hemorrhage in the posterior right temporal lobe,   with areas of surrounding edema, measuring approximately 1.5 x 1.1 cm.   2. Small amounts of intraventricular hemorrhage layering in the occipital   horns of the lateral ventricles. 3. Cerebral parenchymal volume loss with chronic microvascular white matter   ischemic disease. 4. Posterior scalp soft tissue swelling.  No evidence of acute fracture. 5. Scattered moderate paranasal sinus disease with thickened secretions in   the frontal sinuses and ethmoid air cells, correlate with signs of underlying   infection. Primary Complaint: Patient is an 80yo male who presented as a transfer with a fall from standing onto Ice. He fell backwards and hit his head on ice. He had an abrasion to his head. No other complaints at this time. Pt did have LOC for about 3 min per wife. Pain:  Pain Assessment  Pain Assessment: 0-10  Pain Level: 0    Assessment:    Pt presents with mild cognitive deficits characterized by difficulties with short term memory, immediate memory, and deductive reasoning. Pt. Presents with no dysarthria, no O/M deficits at this time. ST to follow up and provide treatment to address noted deficits. Education provided. Recommendations:  Requires SLP Intervention: Yes  Duration/Frequency of Treatment: 3-5x weekly  D/C Recommendations: Further therapy recommended at discharge.        Plan:   Goals:  Short-term Goals  Goal 1: pt. will recall 3-5 units w/ and w/o distractions w/ 90% accuracy. Goal 2: Pt. will complete deductive reasoning tasks w/ 90% accuracy. Goal 3: Pt. utilize memory compensatory strategies to aid in recall. Patient/family involved in developing goals and treatment plan: yes    Subjective:     General  Chart Reviewed: Yes  Family / Caregiver Present: No  Social/Functional History  Lives With: Spouse  Vision  Vision: Within Functional Limits  Hearing  Hearing: Exceptions to [facility]  Hearing Exceptions: Hard of hearing/hearing concerns           Objective:     Oral/Motor  Oral Motor: Within functional limits                             Motor Speech  Motor Speech: Within Functional Limits         Cognition:      Orientation  Overall Orientation Status: Within Normal Limits  Attention  Attention: Within Functional Limits  Memory  Memory: Exceptions to [facility]  Short-term Memory: Mild (Delayed Recall, 3/3, 2/3 increased to 3/3 w/ min cues.)  Immediate Memory: Mild (3/3, 4/5, 3/3)  Problem Solving  Problem Solving: Within Functional Limits  Abstract Reasoning  Convergent Thinking: Moderate (1/4, circumlocution)  Safety/Judgement  Safety/Judgement: Within Functional Limits    Prognosis:  Speech Therapy Prognosis  Prognosis: Good  Individuals consulted  Consulted and agree with results and recommendations: Patient    Education:  Patient Education: yes  Patient Education Response: Verbalizes understanding          Therapy Time:   Individual Concurrent Group Co-treatment   Time In 0915         Time Out 0930         Minutes 15              Completed by: Gela Silva  Clinician    Cosigned By: Kamila Hopper S.CCC/SLP    2/7/2022 12:36 PM

## 2022-02-07 NOTE — FLOWSHEET NOTE
Received into 161 per wheelchair from [facility]. Assisted from chair to bed. Oriented to surroundings. Call light in reach. Side rails up 2 0f 4.

## 2022-02-07 NOTE — PROGRESS NOTES
Occupational Therapy   Occupational Therapy Initial Assessment    Date: 2022   Patient Name: Javier Maynard  MRN: [id number]     : 1940    Date of Service: 2022    Discharge Recommendations:  Patient would benefit from continued therapy after discharge     OT Equipment Recommendations  Equipment Needed: Yes  Mobility Devices: ADL Assistive Devices  ADL Assistive Devices: Transfer Tub Bench;Grab Bars - toilet;Grab Bars - tub  Other: Pt has DME at home in basement and should use this DME (including BSC). Assessment   Performance deficits / Impairments: Decreased functional mobility ; Decreased endurance;Decreased ADL status; Decreased balance;Decreased strength;Decreased safe awareness;Decreased high-level IADLs;Decreased cognition  Assessment: Pt is very motivated to regain functional independence with daily tasks and overall endurance. He currently has deficits / impairments which impact her ability to return to prior living situation / prior level of functioning, but will benefit from continued participation in OT services to improve those skills / functions. Prognosis: Good  Decision Making: Medium Complexity  OT Education: OT Role;Plan of Care;ADL Adaptive Strategies;Transfer Training;Energy Conservation  Barriers to Learning: Pt verbalized / adri Mcwilliams return. REQUIRES OT FOLLOW UP: Yes  Activity Tolerance  Activity Tolerance: Patient Tolerated treatment well         Patient Diagnosis(es): The encounter diagnosis was Intracranial hemorrhage ([address] has a past medical history of Arthritis, Diabetes mellitus ([address] and Hyperlipidemia. has no past surgical history on file. Restrictions  Restrictions/Precautions  Restrictions/Precautions: General Precautions,Fall Risk  Required Braces or Orthoses?: No  Position Activity Restriction  Other position/activity restrictions: Came in for fall 2 resulting in intraparenchymal hemorrhage per emegency med note .     Subjective   General  Patient assessed for rehabilitation services?: Yes  Diagnosis: Intraparenchymal Hemorrhage  Subjective  Subjective: RN approved Pt to participate in OT eval.  Pt was agreeable and cooperative. Patient Currently in Pain: Denies  Pain Assessment  Pain Assessment: 0-10  Pain Level: 0  Patient's Stated Pain Goal: No pain  Vital Signs  Patient Currently in Pain: Denies     Social/Functional History  Social/Functional History  Lives With: Spouse  Type of Home: House  Home Layout: Two level (Half [address] 1st floor (no DME). Full Bath 2nd floor (tub/shower, no DME). )  Home Access: Stairs to enter with rails  Entrance Stairs - Number of Steps: 4  Entrance Stairs - Rails: Both  Bathroom Shower/Tub: Tub/Shower unit (2nd floor)  Bathroom Toilet: Standard  Bathroom Equipment:  (Not currently using DME - has DME available (listed below) which is in his basement (was mother's))  Home Equipment: Long-handled shoehorn,Sock aid,Reacher (Pt has Manual WC, Rollator, Crutches, Cane, BSC, and Shower chair (in basement, were his Mother's). )  ADL Assistance: Independent (Pt able to complete his own UB and LB ADLs. Uses long-handled AE for LBD and LBB. Wears brief d/t bladder incontinence ~10 years.)  Homemaking Assistance: Independent (Pt and Wife share IADLs (cooking / cleaning / home mgmt / med mgmt). )  Homemaking Responsibilities: Yes (split chores with wife)  Ambulation Assistance: Independent (No current DME)  Transfer Assistance: Independent  Active : Yes  Mode of Transportation: Truck,Car  Occupation: Retired  Type of occupation: [address] [place] [address] [employer] [facility] Leisure & Hobbies: Has [employer] cars that he likes to work on.      Objective   Vision: Impaired  Vision Exceptions: Wears glasses for reading  Hearing: Within functional limits    Orientation  Overall Orientation Status: Within Functional Limits (Oriented x4)  Observation/Palpation  Posture: Fair     Balance  Sitting Balance: Stand by assistance  Standing Balance: Contact guard assistance  Standing Balance  Time: Standing balance x1 (~90 sec) during ADL. Activity: Dynamic standing balance during grooming at the sink. Comment: Without DME, maintained standing balance with CGA. Min VCs for safety awareness. Functional Mobility  Functional - Mobility Device:  (Hand-held assist)  Activity: To/from bathroom  Assist Level: Contact guard assistance  Functional Mobility Comments: Functional Mobility for in-room distances / negotiation (with hand-held) CGA for balance. Mod VCs for task initiation / sequencing / safety. Toilet Transfers  Toilet - Technique: Ambulating  Equipment Used: Grab bars  Toilet Transfer: Minimal assistance  Toilet Transfers Comments: Toilet t/f with (hand-held) [address] [facility] [address] for balance and standard toilet with Bilateral Grab Bars. Mod VCs for task initiation / sequencing. ADL  Feeding: Setup (Setup for drinking)  Grooming: Increased time to complete;Verbal cueing;Contact guard assistance (CGA standing at the sink with hand-held assist)  UE Dressing: Minimal assistance; Increased time to complete;Verbal cueing  LE Dressing: Minimal assistance; Increased time to complete;Verbal cueing (Min Assist to don / doff socks without DME / AE (Pt typically uses long-handled AE))  Toileting: Increased time to complete;Minimal assistance  Additional Comments: Pt maintained standing balance during all ADLs with Min Assist (LB ADLs / Toileting), required CGA for maintenance of balance standing at the sink for grooming. Mod VCs for task initiation / sequencing / safety. Tone RUE  RUE Tone: Normotonic  Tone LUE  LUE Tone: Normotonic  Coordination  Movements Are Fluid And Coordinated: No  Coordination and Movement description: Right UE;Left UE;Decreased speed     Bed mobility  Supine to Sit: Stand by assistance  Sit to Supine: Stand by assistance  Scooting: Stand by assistance  Comment: With HOB elevated (~30*) and use of Bilateral Handrails.   Transfers  Sit to stand: Minimal assistance  Stand to sit: Minimal assistance  Transfer Comments: Completed transfers / transitional movements with hand-held assist - required Min Assist for balance. Mod VCs for task initiation / sequencing. Cognition  Overall Cognitive Status: Exceptions  Arousal/Alertness: Delayed responses to stimuli  Following Commands: Follows one step commands with increased time; Follows one step commands with repetition  Attention Span: Appears intact  Memory: Appears intact  Safety Judgement: Decreased awareness of need for assistance  Insights: Decreased awareness of deficits  Initiation: Requires cues for some  Sequencing: Requires cues for some  Cognition Comment: General delay in processing this date. Mod VCs for task initiation / sequencing. Sensation  Overall Sensation Status: Impaired (Numbness and tingling in the hands bilaterally with decreased sensation of digits 4-5 to light touch at baseline. Pt notes that sometimes his feet get numb and tingly as well.)      LUE AROM (degrees)  LUE AROM : WFL  Left Hand AROM (degrees)  Left Hand AROM: WFL  RUE AROM (degrees)  RUE AROM : WFL  Right Hand AROM (degrees)  Right Hand AROM: WFL  LUE Strength  Gross LUE Strength: WFL (4/5)  L Hand General: 3+/5  RUE Strength  Gross RUE Strength: WFL (4/5)  R Hand General: 4-/5     Plan   Plan  Times per week: 3-5x/week  Current Treatment Recommendations: Strengthening,Patient/Caregiver Education & Training,Home Management Training,Equipment Evaluation, Education, & procurement,Balance Training,Neuromuscular Re-education,Functional Mobility Training,Self-Care / ADL,Safety Education & Training,Endurance Training    AM-PAC Score  AM-[facility] Inpatient Daily Activity Raw Score: 19 (02/07/22 1249)  AM-PAC Inpatient ADL T-Scale Score : 40.22 (02/07/22 1249)  ADL Inpatient CMS 0-100% Score: 42.8 (02/07/22 1249)  ADL Inpatient CMS G-Code Modifier : CK (02/07/22 1249)    Goals  Short term goals  Time Frame for Short term goals:  By Discharge  Short term goal 1: Pt will participate in UB ADLs with Mod I with Good Integration of EC / Ax pacing. Short term goal 2: Pt will participate in Toileting (hygiene / clothing mgmt) with Mod I without LOB. Short term goal 3: Pt will participate in Bathroom Transfers with Mod I while maintaining FAIR+ balance. Short term goal 4: Pt will maintain Dynamic Standing Balance (good balance, 10-12 minutes) while participating in leisure / self-selected tasks. Short term goal 5: Pt will participate in Functional Mobility for in-room / in-home distances with Mod I without LOB.        Therapy Time   Individual Concurrent Group Co-treatment   Time In 1110         Time Out 1138         Minutes 28         Timed Code Treatment Minutes: 23 Minutes (ADL)       ZHAO Pedroza, OTR/L

## 2022-02-07 NOTE — CARE COORDINATION
SBIRT complete. Pt denies alcohol/drug use and also denies any past or present depression/suicidal ideations. Pt plans to return home with his wife at discharge and does not state any concerns regarding discharge home. Alcohol Screening and Brief Intervention        Recent Labs     02/05/22  1904   ALC <10       Alcohol Pre-screening  (MEN ONLY) How many times in the past year have you had 5 or more drinks in a day?: None  (WOMEN ONLY) How many times in the past year have you had 4 or more drinks in a day?:  (n/a)    Alcohol Screening Audit       Drug Pre-Screening   How many times in the past year have you used a recreational drug or used a prescription medication for nonmedical reasons?: None    Drug Screening DAST       Mood Pre-Screening (PHQ-2)  During the past two weeks, have you been bothered by little interest or pleasure in doing things?: No  During the past two weeks, have you been bothered by feeling down, depressed, or hopeless?: No    Mood Pre-Screening (PHQ-9)         I have interviewed Melissahenry Raza, [phone] regarding  His alcohol consumption/drug use and risk for excessive use. Screenings were negative. Patient  N/A intervention at this time. Please see social work note regarding intervention.     Deferred []    Completed on: 2/7/2022   [facility]IL[facility]

## 2022-02-07 NOTE — PROGRESS NOTES
RN d/c pt. Pt was d/c with all paperwork and questions answered in full. RN went over all DME equipment with pt and family and pt stated he already had all of the equipment.

## 2022-02-07 NOTE — PROGRESS NOTES
RN asked pt if he already had DME equipment at home and pt does and is declining all DME equipment at d/c.

## 2022-02-07 NOTE — PROGRESS NOTES
PROGRESS NOTE          PATIENT NAME: Carole Catalan  MEDICAL RECORD NO. [id number]  DATE: 2022  SURGEON: Dr. Wai Leonard: No primary care provider on file. HD: # 2    ASSESSMENT    79year old male with R IPH     MEDICAL DECISION MAKING AND PLAN    1. Neuro:  1. IPH  1. CT head : 8 mm x 1 cm right-sided IPH, no shift   2. CT head : 1.5 cm x 1.1 cm IPH with small amounts of IVH, no shift  3. Neurosurgery consulted: no intervention, okay for DVT ppx   2. GCS: 15  3. Pain regimen: tylenol, robaxin   2. CV:  1. HR: 61 - 88  2. MAP: 85 - 135   3. Intermittent hypertension - received labetalol 10 mg x 2 overnight with appropriate response. Will continue to monitor BP. 4. Home meds: fenofibrate, lipitor   3. Heme:  1. CBC pending   2. Will begin DVT ppx today   4. Pulm:  1. Maintaining saturations > 88% on RA   5. Renal/lytes:  1. BMP pending   2. Voiding spontaneously   6. GI:  1. Renal diet   2. Pepcid 20 mg BID   7. ID:  1. CBC pending   2. Tmax: 98.6 (37)  8. Endo:  1. B  2. HDSS   9. MSK:  1. PT/OT    10. Lines  1. PIV       CHECKLIST    CAM-ICU RASS: 0  RESTRAINTS: None  IVF: N/A  NUTRITION: Regular diet   ANTIBIOTICS: None  GI: Pepcid 20 mg BID   DVT: SCD's - will begin heparin SQ   GLYCEMIC CONTROL: HDSS   HOB >45: Yes  MOBILITY: With assistance   Wound care: Turn and position/offload       Chief Complaint: \"None\"    SUBJECTIVE    Carole Catalan was seen and examined at bedside. Patient with intermittent hypertension overnight. Received labetalol 10 mg x 2 with adequate response. Patient denies any pain this morning. Alert and appropriate. Tolerating diet and voiding without difficulty.        OBJECTIVE  VITALS: Temp: Temp: 98.6 °F (37 °C)Temp  Av.4 °F (36.9 °C)  Min: 98.2 °F (36.8 °C)  Max: 98.6 °F (37 °C) BP Systolic (18MYC), SYR:487 , Min:132 , SJX:819   Diastolic (71NEC), NXY:22, Min:59, Max:116   Pulse Pulse  Av.4  Min: 60  Max: 87 Resp Resp  Av.7  Min: 9  Max: 30 Pulse ox SpO2  Av.8 %  Min: 89 %  Max: 99 %  GENERAL: awake, alert, no apparent distress   NEURO: awake, alert, GCS 15, moves all extremities, following commands  HEENT: NCAT   LUNGS: no acute respiratory distress or accessory muscle use  HEART: regular rate   ABDOMEN: soft, non-distended   EXTREMITY: no cyanosis, clubbing or edema    I/O last 3 completed shifts: In: 8472 [P.O.:720; I.V.:756]  Out: 2980 [Urine:2980]    Drain/tube output: In: 1926 [P.O.:1170; I.V.:756]  Out: 4155 [Urine:4155]    LAB:  CBC:   Recent Labs     22  0508   WBC 17.6* 10.4   HGB 12.5* 11.2*   HCT 39.0* 34.5*   MCV 96.8 96.4    269     BMP:   Recent Labs     22  0508    138   K 5.0 4.6    107   CO2 24 22   BUN 36* 30*   CREATININE 1.47* 1.33*   GLUCOSE 128* 103*     COAGS:   Recent Labs     22  190   APTT 23.7   INR 1.0       RADIOLOGY:  CT HEAD WO CONTRAST    Result Date: 2022  EXAMINATION: CT OF THE HEAD WITHOUT CONTRAST  2022 10:36 am TECHNIQUE: CT of the head was performed without the administration of intravenous contrast. Dose modulation, iterative reconstruction, and/or weight based adjustment of the mA/kV was utilized to reduce the radiation dose to as low as reasonably achievable. COMPARISON: None HISTORY: ORDERING SYSTEM PROVIDED HISTORY: monitoring IPH TECHNOLOGIST PROVIDED HISTORY: monitoring IPH Reason for Exam: follow up IP FINDINGS: BRAIN/VENTRICLES: There is a small intraparenchymal hemorrhage in the posterior right temporal lobe measuring 1.5 x 1.1 cm. Small amounts of intraventricular hemorrhage layering in the occipital horns. There is cerebral parenchymal volume loss with chronic microvascular white matter ischemic disease. The ventricles are enlarged, likely related to involutional changes. Vascular calcifications are noted in the carotid siphons and right vertebral artery.  No other abnormal extra-axial fluid collections are identified. Gray-white differentiation is maintained without evidence of an acute infarct. ORBITS: The visualized portion of the orbits demonstrate no acute abnormality. SINUSES: There is scattered paranasal sinus disease that is moderate in severity. Greatest involvement is noted in the frontal sinuses and ethmoid air cells where acute sinusitis cannot be excluded. The mastoid air cells are clear. SOFT TISSUES/SKULL:  The calvarium is intact. There is periodontal disease, incompletely imaged. There is posterior scalp soft tissue swelling. 1. Small intraparenchymal hemorrhage in the posterior right temporal lobe, with areas of surrounding edema, measuring approximately 1.5 x 1.1 cm. 2. Small amounts of intraventricular hemorrhage layering in the occipital horns of the lateral ventricles. 3. Cerebral parenchymal volume loss with chronic microvascular white matter ischemic disease. 4. Posterior scalp soft tissue swelling. No evidence of acute fracture. 5. Scattered moderate paranasal sinus disease with thickened secretions in the frontal sinuses and ethmoid air cells, correlate with signs of underlying infection. Results discussed with Cecilia Garcia at 10:50 a.m. on 02/06/2022. RECOMMENDATIONS: Unavailable     XR CHEST PORTABLE    Result Date: 2/5/2022  EXAMINATION: ONE XRAY VIEW OF THE CHEST 2/5/2022 7:09 pm COMPARISON: None. HISTORY: ORDERING SYSTEM PROVIDED HISTORY: fall TECHNOLOGIST PROVIDED HISTORY: fall Reason for Exam: Supine port FINDINGS: There is moderate to severe ill-defined multifocal airspace disease involving all lobes of both lungs. Disease is accentuated by the low volume portable technique. There is no pneumothorax or significant pleural effusion. Heart size is upper limits of normal.  No acute bone finding. Moderate to severe ill-defined multifocal pneumonia, possibly COVID pneumonia. Correlate clinically.      US Ed Fast Abdomen Limited    Result Date: 2/5/2022  POCUS_FAST: Exam Information:         Exam type:  Clinically indicated     Indication(s) for Exam:         The exam was performed with the following indications[de-identified]  Blunt trauma     Views Obtained & Images Saved for These Views:          The following organs were examined as part of the FAST exam[de-identified]  Heart, Liver, Spleen, Kidneys, Bladder         The following spaces were examined as part of the FAST exam[de-identified]  Pericardial, Pleural, Sub-phrenic, Morisons pouch, Splenorenal, Retro-vesicular spaces     Findings:         Morison's pouch (RUQ):  Fluid absent         RIGHT pleural space:  Pneumothorax absent         Splenorenal space (LUQ):  Fluid absent         LEFT pleural space:  Pneumothorax absent         Pericardial space:  Fluid absent         Pericardial tamponade?:  Absent         Retrovesicular space:  Fluid absent     Interpretation:         No pathologic free fluid     Confirmatory study:         What confirmatory study was done?:  CT Electronically signed by Robles Hanna on Saturday, February 5, 2022 at 8:19 PM        Lori Raines DO  2/7/22, 6:20 AM

## 2022-02-07 NOTE — PROGRESS NOTES
Patient transfer to room 161 with belongings via wheelchair. Report called to [employer]. Questions and concerns address.

## 2022-02-07 NOTE — PROGRESS NOTES
Physical Therapy    Facility/Department: [address] BURN UNIT  Initial Assessment    NAME: Abe Chaves  : 1940  MRN: [id number]  Chief Complaint   Patient presents with   [place] Fall     Date of Service: 2022    Discharge Recommendations:    Further therapy recommended at discharge. PT Equipment Recommendations  Equipment Needed: No    Assessment   Body structures, Functions, Activity limitations: Decreased functional mobility ; Decreased sensation;Decreased balance;Decreased posture;Decreased strength;Decreased safe awareness;Decreased endurance  Assessment: Pt will benefit from continued acute physcial therapy to address functional deficits. Pt SBA bed mob. Pt CGA trans. amb. no '. Pt is unsafe to walk without skilled assistance. Pt presents with an inefficent gait pattern marked by a significant limp when landing on the R LE. Pts feet were ER bilaterally, L>R. Pt presented with a hip hike and mild circumduction when clearing the R LE. Pt had primarily flat foot contact with intermittent heel toe contact. Pt slowed during turns but did not demonstrate unsteadiness. Pt denies AD use at baseline, but does have w WC, cane, walkers, and shower chair from a late family member. Pt noted the numbness in his hands and feet bilaterally at baseline, but pt states he has never gotten care for the issue. Author advised pt to seek care. Prognosis: Fair  Decision Making: Low Complexity  PT Education: General Safety;Gait Training;PT Role;Plan of Care; Functional Mobility Training;Transfer Training  REQUIRES PT FOLLOW UP: Yes  Activity Tolerance  Activity Tolerance: Patient Tolerated treatment well       Patient Diagnosis(es): The encounter diagnosis was Intracranial hemorrhage ([address] has a past medical history of Arthritis, Diabetes mellitus ([address] and Hyperlipidemia. has no past surgical history on file.     Restrictions  Restrictions/Precautions  Restrictions/Precautions: General Precautions,Fall Risk  Required Braces or Orthoses?: No  Position Activity Restriction  Other position/activity restrictions: Came in for fall 2/5 resulting in intraparenchymal hemorrhage per emegency med note 2/5. Vision/Hearing  Vision: Impaired  Vision Exceptions: Wears glasses for reading  Hearing: Within functional limits     Subjective  General  Chart Reviewed: Yes  Patient assessed for rehabilitation services?: Yes  Response To Previous Treatment: Not applicable  Family / Caregiver Present: No  Follows Commands: Within Functional Limits  General Comment  Comments: Pt and RN agreeable to therapy. Pt supine in bed upon arrival and exit. Subjective  Subjective: Pt states that he has felt \"pretty good today\". Pain Screening  Patient Currently in Pain: Denies  Vital Signs  Patient Currently in Pain: Denies  Pre Treatment Pain Screening  Intervention List: Patient able to continue with treatment    Orientation  Orientation  Overall Orientation Status: Within Functional Limits  Social/Functional History  Social/Functional History  Lives With: Spouse  Type of Home: House  Home Layout: Two level (Half [place] [address] 1st floor (no DME). Full Bath 2nd floor (tub/shower, no DME). )  Home Access: Stairs to enter with rails  Entrance Stairs - Number of Steps: 4  Entrance Stairs - Rails: Both  Bathroom Shower/Tub: Tub/Shower unit (2nd floor)  Bathroom Toilet: Standard  Bathroom Equipment:  (Not currently using DME - has DME available (listed below) which is in his basement (was mother's))  Home Equipment: Long-handled shoehorn,Sock aid,Reacher (Pt has Manual WC, Rollator, Crutches, Cane, BSC, and Shower chair (in basement, were his Mother's). )  ADL Assistance: Independent (Pt able to complete his own UB and LB ADLs. Uses long-handled AE for LBD and LBB. Wears brief d/t bladder incontinence ~10 years.)  Homemaking Assistance: Independent (Pt and Wife share IADLs (cooking / cleaning / home mgmt / med mgmt). )  Homemaking Responsibilities: Yes (split chores with wife)  Ambulation Assistance: Independent (No current DME)  Transfer Assistance: Independent  Active : Yes  Mode of Transportation: Truck,Car  Occupation: Retired  Type of occupation: [employer] (in [facility]). Leisure & Hobbies: Has [employer] cars that he likes to work on. Cognition   Cognition  Overall Cognitive Status: WFL    Objective     Observation/Palpation  Posture: Fair    AROM RLE (degrees)  RLE AROM: WFL  AROM LLE (degrees)  LLE AROM : WFL  AROM RUE (degrees)  RUE AROM : WFL  RUE General AROM: Observed pt use UEs to perform bed mobility and transfers. No apparent deficits. AROM LUE (degrees)  LUE AROM : WFL  LUE General AROM: Observed pt use UEs to perform bed mobility and transfers. No apparent deficits. Strength RLE  Strength RLE: WFL  Comment: Hip flexion=4, knee extension=5, knee flexion= 4+, DF=2/5, PF=4  Strength LLE  Strength LLE: WFL  Comment: Hip flexion=4, knee extension=5, knee flexion= 4+, DF=2/5, PF=4  Strength RUE  Strength RUE: WFL  Comment: Observed pt use UEs to perform bed mobility and transfers. No apparent deficits. Strength LUE  Strength LUE: WFL  Comment: Observed pt use UEs to perform bed mobility and transfers. No apparent deficits. Sensation  Overall Sensation Status: Impaired (Numbness and tingling in the hands bilaterally with decreased sensation of digits 4-5 to light touch at baseline. Pt notes that sometimes his feet get numb and tingly as well.)  Bed mobility  Supine to Sit: Stand by assistance  Sit to Supine: Stand by assistance  Scooting: Stand by assistance  Comment: Pt required use of hand rails to come to sitting with the HOB elevated ~25 degrees  Transfers  Sit to Stand: Contact guard assistance  Stand to sit: Contact guard assistance  Comment: CGA for safety.   Ambulation  Ambulation?: Yes  Ambulation 1  Surface: level tile  Device: No Device  Assistance: Contact guard assistance  Quality of Gait: Pt stated that he believes his legs are different lengths post hip replacement. Pt ambulated with a significant limp when landing on the R LE. Pts feet were ER bilaterally, L>R. Pt presented with a hip hike and mild circumduction when clearing the R LE. Inefficent gait pattern. Pt had primarily flat foot contact with intermittent heel toe contact. Pt slowed during turns but did not demonstrate unsteadiness. Pt unable to straighten knee in standing, pt lacks ~20 deg. Gait Deviations: Slow Vianca;Decreased step length;Decreased step height;Decreased head and trunk rotation;Decreased arm swing  Distance: 170' and 15' to bathroom (pt performed own pericare)  Stairs/Curb  Stairs?: No     Balance  Posture: Fair  Sitting - Static: Good  Sitting - Dynamic: Good  Standing - Static: Fair;+  Standing - Dynamic: Fair  Comments: Assessed without a RW        Plan   Plan  Times per week: 4-5x/wk  Current Treatment Recommendations: Strengthening,ROM,Safety Education & Training,Balance Training,Endurance Training,Patient/Caregiver Education & Training,Functional Mobility Training,Transfer Training,Gait Training,Stair training,Pain Management  Safety Devices  Type of devices:  All fall risk precautions in place,Gait belt,Patient at risk for falls,Left in bed,Nurse notified,Bed alarm in place,Call light within reach  Restraints  Initially in place: No      AM-PAC Score  AM-PAC Inpatient Mobility Raw Score : 18 (02/07/22 1259)  AM-PAC Inpatient T-Scale Score : 43.63 (02/07/22 1259)  Mobility Inpatient CMS 0-100% Score: 46.58 (02/07/22 1259)  Mobility Inpatient CMS G-Code Modifier : CK (02/07/22 1259)          Goals  Short term goals  Time Frame for Short term goals: 14 visits  Short term goal 1: Pt will perform transfers MOD I  Short term goal 2: Pt will perform bed mobility MOD I  Short term goal 3: Pt will ambulate 250' MOD I  Short term goal 4: Pt will ascend/descend 5 steps with unilateral/bilateral railing use MOD I       Therapy Time   Individual Concurrent Group Co-treatment Time In 0935         Time Out 1004         Minutes 29         Timed Code Treatment Minutes: Darlene Laura 1723    This treatment/evaluation completed by signing SPT. Signing PT agrees with treatment and documentation.

## 2022-02-09 LAB
ABO/RH: NORMAL
ANTIBODY IDENTIFICATION: NORMAL
ANTIBODY SCREEN: POSITIVE
ARM BAND NUMBER: NORMAL
BLD PROD TYP BPU: NORMAL
BLD PROD TYP BPU: NORMAL
CROSSMATCH RESULT: NORMAL
CROSSMATCH RESULT: NORMAL
DAT IGG: NEGATIVE
DISPENSE STATUS BLOOD BANK: NORMAL
DISPENSE STATUS BLOOD BANK: NORMAL
EXPIRATION DATE: NORMAL
TRANSFUSION STATUS: NORMAL
TRANSFUSION STATUS: NORMAL
UNIT DIVISION: 0
UNIT DIVISION: 0
UNIT NUMBER: NORMAL
UNIT NUMBER: NORMAL

## 2022-02-17 NOTE — DISCHARGE SUMMARY
DISCHARGE SUMMARY:    PATIENT NAME:  Spring Burch  YOB: 1940  MEDICAL RECORD NO. [id number]  DATE: 02/16/22  PRIMARY CARE PHYSICIAN: No primary care provider on file. ADMIT DATE: 2/6/2022  DISPOSITION:  Home  DISCHARGE DATE:   2/7/2022  ADMITTING DIAGNOSIS:   Intraparenchymal hemorrhage of the brain     DIAGNOSIS:   Patient Active Problem List   Diagnosis    Intraparenchymal hemorrhage of [facility] [address]    Contusion of brain without loss of consciousness ([facility]       CONSULTANTS:  neurosurgery    PROCEDURES: none      HOSPITAL COURSE:   Spring Burch is a 79 y.o. male who was admitted on 2/6/2022 who was transferred from [facility] facility due to fall from [facility]. He was found to have a 1cm IPH and neurosurgery was consulted. No anticoagulation. Neuro intact on exam, GCS 15. Neurosurgery evaluated and recommended outpatient follow up in 10-14 days. Labs and imaging were followed daily. At time of discharge, Spring Burch was tolerating a regular diet,  ambulating on his own accord, had adequate analgesia on oral pain medications, and had no signs of symptoms of complications. He was deemed medically stable and discharged to home on 2/7/2022 with instructions to follow up with neurosurgery outpatient. Pt expressed understanding of and agreement with DC plans. PHYSICAL EXAMINATION:        Discharge Vitals:  height is 5' 10\" (1.778 m) and weight is 202 lb 9.6 oz (91.9 kg). His oral temperature is 98.3 °F (36.8 °C). His blood pressure is 152/71 (abnormal) and his pulse is 85. His respiration is 22 and oxygen saturation is 98%.    General appearance - alert, well appearing, and in no distress  Chest - normal effort, no resp distress, no accessory muscle use   Heart - normal rate and regular rhythm  Abdomen - soft, non tender, non distended, bowel sounds present  Neurological - motor and sensory grossly normal bilaterally  Musculoskeletal - full range of motion without pain  Extremities - no clubbing or cyanosis    LABS:   No results for input(s): WBC, HGB, HCT, PLT, NA, K, CL, CO2, BUN, CREATININE in the last 72 hours. DIAGNOSTIC TESTS:    CT HEAD WO CONTRAST    Result Date: 2/6/2022  EXAMINATION: CT OF THE HEAD WITHOUT CONTRAST  2/6/2022 10:36 am TECHNIQUE: CT of the head was performed without the administration of intravenous contrast. Dose modulation, iterative reconstruction, and/or weight based adjustment of the mA/kV was utilized to reduce the radiation dose to as low as reasonably achievable. COMPARISON: None HISTORY: ORDERING SYSTEM PROVIDED HISTORY: monitoring IPH TECHNOLOGIST PROVIDED HISTORY: monitoring IPH Reason for Exam: follow up IP FINDINGS: BRAIN/VENTRICLES: There is a small intraparenchymal hemorrhage in the posterior right temporal lobe measuring 1.5 x 1.1 cm. Small amounts of intraventricular hemorrhage layering in the occipital horns. There is cerebral parenchymal volume loss with chronic microvascular white matter ischemic disease. The ventricles are enlarged, likely related to involutional changes. Vascular calcifications are noted in the carotid siphons and right vertebral artery. No other abnormal extra-axial fluid collections are identified. Gray-white differentiation is maintained without evidence of an acute infarct. ORBITS: The visualized portion of the orbits demonstrate no acute abnormality. SINUSES: There is scattered paranasal sinus disease that is moderate in severity. Greatest involvement is noted in the frontal sinuses and ethmoid air cells where acute sinusitis cannot be excluded. The mastoid air cells are clear. SOFT TISSUES/SKULL:  The calvarium is intact. There is periodontal disease, incompletely imaged. There is posterior scalp soft tissue swelling.      1. Small intraparenchymal hemorrhage in the posterior right temporal lobe, with areas of surrounding edema, measuring approximately 1.5 x 1.1 cm. 2. Small amounts of intraventricular hemorrhage layering in the occipital horns of the lateral ventricles. 3. Cerebral parenchymal volume loss with chronic microvascular white matter ischemic disease. 4. Posterior scalp soft tissue swelling. No evidence of acute fracture. 5. Scattered moderate paranasal sinus disease with thickened secretions in the frontal sinuses and ethmoid air cells, correlate with signs of underlying infection. Results discussed with Cecilia Garcia at 10:50 a.m. on 02/06/2022. RECOMMENDATIONS: Unavailable     XR CHEST PORTABLE    Result Date: 2/5/2022  EXAMINATION: ONE XRAY VIEW OF THE CHEST 2/5/2022 7:09 pm COMPARISON: None. HISTORY: ORDERING SYSTEM PROVIDED HISTORY: fall TECHNOLOGIST PROVIDED HISTORY: fall Reason for Exam: Supine port FINDINGS: There is moderate to severe ill-defined multifocal airspace disease involving all lobes of both lungs. Disease is accentuated by the low volume portable technique. There is no pneumothorax or significant pleural effusion. Heart size is upper limits of normal.  No acute bone finding. Moderate to severe ill-defined multifocal pneumonia, possibly COVID pneumonia. Correlate clinically. US Ed Fast Abdomen Limited    Result Date: 2/5/2022  POCUS_FAST:     Exam Information:         Exam type:  Clinically indicated     Indication(s) for Exam:         The exam was performed with the following indications[de-identified]  Blunt trauma     Views Obtained & Images Saved for These Views:          The following organs were examined as part of the FAST exam[de-identified]  Heart, Liver, Spleen, Kidneys, Bladder         The following spaces were examined as part of the FAST exam[de-identified]  Pericardial, Pleural, Sub-phrenic, Morisons pouch, Splenorenal, Retro-vesicular spaces     Findings:         Morison's pouch (RUQ):  Fluid absent         RIGHT pleural space:  Pneumothorax absent         Splenorenal space (LUQ):  Fluid absent         LEFT pleural space:  Pneumothorax absent         Pericardial space:  Fluid absent         Pericardial tamponade?:

## 2022-02-21 ENCOUNTER — OFFICE VISIT (OUTPATIENT)
Dept: NEUROSURGERY | Age: 82
End: 2022-02-21
Payer: MEDICARE

## 2022-02-21 VITALS
OXYGEN SATURATION: 95 % | DIASTOLIC BLOOD PRESSURE: 68 MMHG | BODY MASS INDEX: 28.92 KG/M2 | HEIGHT: 70 IN | WEIGHT: 202 LBS | HEART RATE: 68 BPM | TEMPERATURE: 98.3 F | SYSTOLIC BLOOD PRESSURE: 138 MMHG

## 2022-02-21 DIAGNOSIS — S06.2X0D CONTUSION OF BRAIN WITHOUT LOSS OF CONSCIOUSNESS, SUBSEQUENT ENCOUNTER: Primary | ICD-10-CM

## 2022-02-21 PROCEDURE — 99213 OFFICE O/P EST LOW 20 MIN: CPT | Performed by: NURSE PRACTITIONER

## 2022-03-03 NOTE — DISCHARGE SUMMARY
DISCHARGE SUMMARY:     PATIENT NAME:  Camille Lieberman  YOB: 1940  MEDICAL RECORD NO. [id number]  PRIMARY CARE PHYSICIAN: No primary care provider on file. ADMIT DATE: 2/5/2022  DISPOSITION:  Home  DISCHARGE DATE:   2/7/2022  ADMITTING DIAGNOSIS:   Intraparenchymal hemorrhage of the brain      DIAGNOSIS:       Patient Active Problem List   Diagnosis    Intraparenchymal hemorrhage of brain ([facility] [address]    Contusion of brain without loss of consciousness ([facility] [address]         CONSULTANTS:  neurosurgery     PROCEDURES: none        HOSPITAL COURSE:   Camille Lieberman is a 79 y.o. male who was admitted on 2/6/2022 who was transferred from [facility] facility due to fall from [address] [place] [address]. He was found to have a 1cm IPH and neurosurgery was consulted. No anticoagulation. Neuro intact on exam, GCS 15. Neurosurgery evaluated and recommended outpatient follow up in 10-14 days.      Labs and imaging were followed daily.       At time of discharge, Camille Lieberman was tolerating a regular diet,  ambulating on his own accord, had adequate analgesia on oral pain medications, and had no signs of symptoms of complications. He was deemed medically stable and discharged to home on 2/7/2022 with instructions to follow up with neurosurgery outpatient. Pt expressed understanding of and agreement with DC plans.       PHYSICAL EXAMINATION:         Discharge Vitals:  height is 5' 10\" (1.778 m) and weight is 202 lb 9.6 oz (91.9 kg). His oral temperature is 98.3 °F (36.8 °C). His blood pressure is 152/71 (abnormal) and his pulse is 85. His respiration is 22 and oxygen saturation is 98%.    General appearance - alert, well appearing, and in no distress  Chest - normal effort, no resp distress, no accessory muscle use   Heart - normal rate and regular rhythm  Abdomen - soft, non tender, non distended, bowel sounds present  Neurological - motor and sensory grossly normal bilaterally  Musculoskeletal - full range of motion without pain  Extremities - no clubbing or cyanosis     LABS:   No results for input(s): WBC, HGB, HCT, PLT, NA, K, CL, CO2, BUN, CREATININE in the last 72 hours.     DIAGNOSTIC TESTS:    CT HEAD WO CONTRAST     Result Date: 2/6/2022  EXAMINATION: CT OF THE HEAD WITHOUT CONTRAST  2/6/2022 10:36 am TECHNIQUE: CT of the head was performed without the administration of intravenous contrast. Dose modulation, iterative reconstruction, and/or weight based adjustment of the mA/kV was utilized to reduce the radiation dose to as low as reasonably achievable. COMPARISON: None HISTORY: ORDERING SYSTEM PROVIDED HISTORY: monitoring IPH TECHNOLOGIST PROVIDED HISTORY: monitoring IPH Reason for Exam: follow up IP FINDINGS: BRAIN/VENTRICLES: There is a small intraparenchymal hemorrhage in the posterior right temporal lobe measuring 1.5 x 1.1 cm. Small amounts of intraventricular hemorrhage layering in the occipital horns. There is cerebral parenchymal volume loss with chronic microvascular white matter ischemic disease. The ventricles are enlarged, likely related to involutional changes. Vascular calcifications are noted in the carotid siphons and right vertebral artery. No other abnormal extra-axial fluid collections are identified. Gray-white differentiation is maintained without evidence of an acute infarct. ORBITS: The visualized portion of the orbits demonstrate no acute abnormality. SINUSES: There is scattered paranasal sinus disease that is moderate in severity. Greatest involvement is noted in the frontal sinuses and ethmoid air cells where acute sinusitis cannot be excluded. The mastoid air cells are clear. SOFT TISSUES/SKULL:  The calvarium is intact. There is periodontal disease, incompletely imaged. There is posterior scalp soft tissue swelling.      1.  Small intraparenchymal hemorrhage in the posterior right temporal lobe, with areas of surrounding edema, measuring approximately 1.5 x 1.1 cm. 2. Small amounts of intraventricular hemorrhage layering in the occipital horns of the lateral ventricles. 3. Cerebral parenchymal volume loss with chronic microvascular white matter ischemic disease. 4. Posterior scalp soft tissue swelling. No evidence of acute fracture. 5. Scattered moderate paranasal sinus disease with thickened secretions in the frontal sinuses and ethmoid air cells, correlate with signs of underlying infection. Results discussed with Cecilia Garcia at 10:50 a.m. on 02/06/2022. RECOMMENDATIONS: Unavailable      XR CHEST PORTABLE     Result Date: 2/5/2022  EXAMINATION: ONE XRAY VIEW OF THE CHEST 2/5/2022 7:09 pm COMPARISON: None. HISTORY: ORDERING SYSTEM PROVIDED HISTORY: fall TECHNOLOGIST PROVIDED HISTORY: fall Reason for Exam: Supine port FINDINGS: There is moderate to severe ill-defined multifocal airspace disease involving all lobes of both lungs. Disease is accentuated by the low volume portable technique. There is no pneumothorax or significant pleural effusion. Heart size is upper limits of normal.  No acute bone finding.      Moderate to severe ill-defined multifocal pneumonia, possibly COVID pneumonia. Correlate clinically.      US Ed Fast Abdomen Limited     Result Date: 2/5/2022  POCUS_FAST:     Exam Information:         Exam type:  Clinically indicated     Indication(s) for Exam:         The exam was performed with the following indications[de-identified]  Blunt trauma     Views Obtained & Images Saved for These Views:          The following organs were examined as part of the FAST exam[de-identified]  Heart, Liver, Spleen, Kidneys, Bladder         The following spaces were examined as part of the FAST exam[de-identified]  Pericardial, Pleural, Sub-phrenic, Morisons pouch, Splenorenal, Retro-vesicular spaces     Findings:         Morison's pouch (RUQ):  Fluid absent         RIGHT pleural space:  Pneumothorax absent         Splenorenal space (LUQ):  Fluid absent         LEFT pleural space:  Pneumothorax absent         Pericardial space:  Fluid absent         Pericardial tamponade?: Absent         Retrovesicular space:  Fluid absent     Interpretation:         No pathologic free fluid     Confirmatory study:         What confirmatory study was done?:  CT Electronically signed by Claudeen Ruder on Saturday, February 5, 2022 at 8:19 PM        DISCHARGE INSTRUCTIONS      Discharge Medications:         Medication List       CONTINUE taking these medications    acetaminophen 500 MG tablet  Commonly known as: TYLENOL      atorvastatin 20 MG tablet  Commonly known as: LIPITOR      fenofibrate 145 MG tablet  Commonly known as: TRICOR      ibuprofen 600 MG tablet  Commonly known as: ADVIL;MOTRIN      metFORMIN 500 MG tablet  Commonly known as: GLUCOPHAGE      Osteo Bi-Flex Regular Strength 250-200 MG Tabs  Generic drug: Glucosamine-Chondroitin      oxyCODONE-acetaminophen 5-325 MG per tablet  Commonly known as: PERCOCET      vitamin D 25 MCG (1000 UT) Tabs tablet  Commonly known as: CHOLECALCIFEROL             Diet: regular diet as tolerated  Activity: - Avoid strenuous activity or exercise until cleared during follow-up appointment  - No driving or operating heavy machinery while taking narcotics   Wound Care: Daily and as needed  Follow-up:   Follow up in 10-14 days with neurosurgery      Time Spent for discharge: 29 minutes     Cecilia Garcia DO

## 2025-06-20 ENCOUNTER — OFFICE VISIT (OUTPATIENT)
Age: 85
End: 2025-06-20

## 2025-06-20 VITALS
TEMPERATURE: 97.9 F | OXYGEN SATURATION: 94 % | HEART RATE: 100 BPM | SYSTOLIC BLOOD PRESSURE: 114 MMHG | WEIGHT: 161 LBS | BODY MASS INDEX: 23.1 KG/M2 | DIASTOLIC BLOOD PRESSURE: 47 MMHG
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
## 2025-06-20 DIAGNOSIS — S51.012A SKIN TEAR OF LEFT ELBOW WITHOUT COMPLICATION, INITIAL ENCOUNTER: Primary | ICD-10-CM

## 2025-06-20 DIAGNOSIS — S61.411A SKIN TEAR OF RIGHT HAND WITHOUT COMPLICATION, INITIAL ENCOUNTER: ICD-10-CM

## 2025-06-20 RX ORDER — POTASSIUM CHLORIDE 1.5 G/1.58G
20 POWDER, FOR SOLUTION ORAL 2 TIMES DAILY
COMMUNITY

## 2025-06-20 RX ORDER — OMEPRAZOLE 20 MG/1
40 CAPSULE, DELAYED RELEASE ORAL DAILY
COMMUNITY

## 2025-06-20 RX ORDER — MAGNESIUM GLUCONATE 27 MG(500)
500 TABLET ORAL 2 TIMES DAILY
COMMUNITY

## 2025-06-20 ASSESSMENT — ENCOUNTER SYMPTOMS
VOMITING: 0
EYE PAIN: 0
ABDOMINAL DISTENTION: 0
FACIAL SWELLING: 0
CONSTIPATION: 0
DIARRHEA: 0
NAUSEA: 0
VOICE CHANGE: 0
ABDOMINAL PAIN: 0
SORE THROAT: 0
EYE DISCHARGE: 0
SHORTNESS OF BREATH: 0
TROUBLE SWALLOWING: 0
EYE REDNESS: 0
COUGH: 0
COLOR CHANGE: 0
BACK PAIN: 0
CHEST TIGHTNESS: 0

## 2025-06-20 NOTE — PROGRESS NOTES
arthralgias, back pain, gait problem and neck pain.   Skin:  Positive for wound. Negative for color change and rash.   Allergic/Immunologic: Negative for immunocompromised state.   Neurological:  Negative for dizziness, tremors, seizures, syncope, speech difficulty, weakness, light-headedness and headaches.   Hematological:  Negative for adenopathy. Does not bruise/bleed easily.   Psychiatric/Behavioral:  Negative for behavioral problems, confusion, decreased concentration and hallucinations. The patient is not nervous/anxious.        Physical Exam  Vitals and nursing note reviewed.   Constitutional:       Appearance: Normal appearance. He is well-developed.   HENT:      Head: Normocephalic and atraumatic.      Right Ear: External ear normal.      Left Ear: External ear normal.      Nose: Nose normal.      Mouth/Throat:      Mouth: Mucous membranes are moist.      Pharynx: Oropharynx is clear.   Eyes:      Conjunctiva/sclera: Conjunctivae normal.      Pupils: Pupils are equal, round, and reactive to light.   Neck:      Vascular: No JVD.      Trachea: No tracheal deviation.   Cardiovascular:      Rate and Rhythm: Normal rate and regular rhythm.      Heart sounds: Normal heart sounds.   Pulmonary:      Effort: Pulmonary effort is normal. No respiratory distress.      Breath sounds: Normal breath sounds. No wheezing or rhonchi.   Abdominal:      General: There is no distension.      Palpations: Abdomen is soft.      Tenderness: There is no abdominal tenderness.   Musculoskeletal:         General: No deformity. Normal range of motion.      Cervical back: Normal range of motion and neck supple. No rigidity.   Skin:     General: Skin is warm and dry.      Capillary Refill: Capillary refill takes less than 2 seconds.      Comments: Small skin tear the size of a marie on the back of his right hand.  Small skin tear and bruise on the top of his head about the size of a nikel.  Large skin tear involving the entire left elbow